# Patient Record
Sex: MALE | Race: ASIAN | Employment: FULL TIME | URBAN - METROPOLITAN AREA
[De-identification: names, ages, dates, MRNs, and addresses within clinical notes are randomized per-mention and may not be internally consistent; named-entity substitution may affect disease eponyms.]

---

## 2017-01-06 ENCOUNTER — OFFICE VISIT (OUTPATIENT)
Dept: FAMILY MEDICINE | Facility: CLINIC | Age: 37
End: 2017-01-06
Payer: COMMERCIAL

## 2017-01-06 VITALS
BODY MASS INDEX: 29.4 KG/M2 | WEIGHT: 210 LBS | SYSTOLIC BLOOD PRESSURE: 110 MMHG | TEMPERATURE: 99.3 F | HEART RATE: 80 BPM | DIASTOLIC BLOOD PRESSURE: 80 MMHG | HEIGHT: 71 IN

## 2017-01-06 DIAGNOSIS — K52.9 GASTROENTERITIS: Primary | ICD-10-CM

## 2017-01-06 DIAGNOSIS — R19.7 DIARRHEA, UNSPECIFIED TYPE: ICD-10-CM

## 2017-01-06 LAB
ERYTHROCYTE [DISTWIDTH] IN BLOOD BY AUTOMATED COUNT: 14.4 % (ref 10–15)
HCT VFR BLD AUTO: 50.6 % (ref 40–53)
HGB BLD-MCNC: 17.6 G/DL (ref 13.3–17.7)
MCH RBC QN AUTO: 31 PG (ref 26.5–33)
MCHC RBC AUTO-ENTMCNC: 34.8 G/DL (ref 31.5–36.5)
MCV RBC AUTO: 89 FL (ref 78–100)
PLATELET # BLD AUTO: 215 10E9/L (ref 150–450)
RBC # BLD AUTO: 5.68 10E12/L (ref 4.4–5.9)
WBC # BLD AUTO: 9.2 10E9/L (ref 4–11)

## 2017-01-06 PROCEDURE — 99213 OFFICE O/P EST LOW 20 MIN: CPT | Performed by: FAMILY MEDICINE

## 2017-01-06 PROCEDURE — 85027 COMPLETE CBC AUTOMATED: CPT | Performed by: FAMILY MEDICINE

## 2017-01-06 PROCEDURE — 36415 COLL VENOUS BLD VENIPUNCTURE: CPT | Performed by: FAMILY MEDICINE

## 2017-01-06 RX ORDER — LOPERAMIDE HYDROCHLORIDE 2 MG/1
TABLET ORAL
Qty: 30 TABLET | Refills: 0 | Status: SHIPPED | OUTPATIENT
Start: 2017-01-06 | End: 2017-08-03

## 2017-01-06 NOTE — PROGRESS NOTES
SUBJECTIVE:                                                    Kwasi Gonzalez is a 36 year old male who presents to clinic today for the following health issues:      Diarrhea     Onset: last night     Description:   Consistency of stool: watery and runny  Blood in stool: no   Number of loose stools in past 24 hours: 10    Progression of Symptoms:  same    Accompanying Signs & Symptoms:  Fever: YES  Nausea or vomiting; YES  Abdominal pain: YES  Episodes of constipation: no   Weight loss: no    History:   Ill contacts: no   Recent use of antibiotics: no    Recent travels: no          Recent medication-new or changes(Rx or OTC): no     Precipitating factors:       Alleviating factors:          Therapies Tried and outcome:  ; Outcome:     Problem list and histories reviewed & adjusted, as indicated.  Additional history: as documented    Patient Active Problem List   Diagnosis     Elevated serum creatinine     Elevated liver enzymes     Family history of early CAD     Family history of diabetes mellitus     Obesity     Hyperlipidemia LDL goal <130     Vitamin D deficiency     Type 2 diabetes mellitus without complications (H)     Past Surgical History   Procedure Laterality Date     No history of surgery         Social History   Substance Use Topics     Smoking status: Former Smoker     Types: Cigarettes     Quit date: 2015     Smokeless tobacco: Never Used     Alcohol Use: No     Family History   Problem Relation Age of Onset     DIABETES Mother      DIABETES Father      HEART DISEASE Father      CAD   of MI age 58     C.A.D. Mother      CAD age 64         Current Outpatient Prescriptions   Medication Sig Dispense Refill     loperamide (IMODIUM A-D) 2 MG tablet Take 2 tabs (4 mg) after first loose stool, and then take one tab (2 mg) after each diarrheal stool.  Max of 8 tabs (16 mg) per day. 30 tablet 0     blood glucose monitoring (NO BRAND SPECIFIED) test strip Use to test blood sugar one time daily or as  "directed.  Please dispense Accucheck Masha test strips. 1 Box 6     blood glucose monitoring (ACCU-CHEK MASHA PLUS) meter device kit Use to test blood sugars one time daily or as directed. 1 kit 0     blood glucose monitoring (ACCU-CHEK MULTICLIX) lancets Use to test blood sugar once daily or as directed. 1 Box 6     Allergies   Allergen Reactions     No Known Drug Allergies      Recent Labs   Lab Test  10/20/16   0900  05/27/16   0854  03/17/16   0805  07/31/15   0722   A1C  5.8  5.8   --   6.1*   LDL  114*  97  107*  104   HDL  43  36*  34*  35*   TRIG  143  139  239*  270*   ALT   --   52  70  62   CR  1.31*  1.29*  1.24  1.28*   GFRESTIMATED  61  63  66  64   GFRESTBLACK  74  76  80  77   POTASSIUM  4.5  4.3  4.3  4.4      BP Readings from Last 3 Encounters:   01/06/17 110/80   11/08/16 130/86   10/20/16 122/80    Wt Readings from Last 3 Encounters:   01/06/17 210 lb (95.255 kg)   11/08/16 200 lb (90.719 kg)   10/20/16 201 lb (91.173 kg)                    ROS:  Constitutional, HEENT, cardiovascular, pulmonary, gi and gu systems are negative, except as otherwise noted.    OBJECTIVE:                                                    /80 mmHg  Pulse 80  Temp(Src) 99.3  F (37.4  C) (Tympanic)  Ht 5' 11\" (1.803 m)  Wt 210 lb (95.255 kg)  BMI 29.30 kg/m2  Body mass index is 29.3 kg/(m^2).  GENERAL: healthy, alert and no distress  NECK: no adenopathy, no asymmetry, masses, or scars and thyroid normal to palpation  RESP: lungs clear to auscultation - no rales, rhonchi or wheezes  CV: regular rate and rhythm, normal S1 S2, no S3 or S4, no murmur, click or rub, no peripheral edema and peripheral pulses strong  ABDOMEN: soft, nontender, no hepatosplenomegaly, no masses and bowel sounds normal  MS: no gross musculoskeletal defects noted, no edema         ASSESSMENT/PLAN:                                                    Kwasi was seen today for diarrhea.    Diagnoses and all orders for this " visit:    Gastroenteritis  -     CBC with platelets  -     Enteric Bacteria and Virus Panel by [facility] Stool; Future  -     Giardia antigen; Future  -     Clostridium difficile Toxin B PCR; Future  -     Fecal leukocyte; Future  -     Ova and Parasite Exam Routine; Future  -     loperamide (IMODIUM A-D) 2 MG tablet; Take 2 tabs (4 mg) after first loose stool, and then take one tab (2 mg) after each diarrheal stool.  Max of 8 tabs (16 mg) per day.    Diarrhea, unspecified type  -     loperamide (IMODIUM A-D) 2 MG tablet; Take 2 tabs (4 mg) after first loose stool, and then take one tab (2 mg) after each diarrheal stool.  Max of 8 tabs (16 mg) per day.      Will review lab and consider if needed abx         Samuel Landrum MD  [facility] [place]

## 2017-01-06 NOTE — NURSING NOTE
"Chief Complaint   Patient presents with     Diarrhea       Initial /80 mmHg  Pulse 80  Temp(Src) 99.3  F (37.4  C) (Tympanic)  Ht 5' 11\" (1.803 m)  Wt 210 lb (95.255 kg)  BMI 29.30 kg/m2 Estimated body mass index is 29.3 kg/(m^2) as calculated from the following:    Height as of this encounter: 5' 11\" (1.803 m).    Weight as of this encounter: 210 lb (95.255 kg).  BP completed using cuff size: pieter Valencia CMA    "

## 2017-08-03 ENCOUNTER — OFFICE VISIT (OUTPATIENT)
Dept: FAMILY MEDICINE | Facility: CLINIC | Age: 37
End: 2017-08-03
Payer: COMMERCIAL

## 2017-08-03 VITALS
WEIGHT: 202 LBS | HEIGHT: 71 IN | TEMPERATURE: 97.6 F | BODY MASS INDEX: 28.28 KG/M2 | HEART RATE: 82 BPM | SYSTOLIC BLOOD PRESSURE: 120 MMHG | OXYGEN SATURATION: 98 % | RESPIRATION RATE: 14 BRPM | DIASTOLIC BLOOD PRESSURE: 81 MMHG

## 2017-08-03 DIAGNOSIS — Z13.89 SCREENING FOR DIABETIC PERIPHERAL NEUROPATHY: ICD-10-CM

## 2017-08-03 DIAGNOSIS — E11.9 TYPE 2 DIABETES MELLITUS WITHOUT COMPLICATION, UNSPECIFIED LONG TERM INSULIN USE STATUS: Primary | ICD-10-CM

## 2017-08-03 DIAGNOSIS — R74.8 ELEVATED LIVER ENZYMES: ICD-10-CM

## 2017-08-03 DIAGNOSIS — R79.89 ELEVATED SERUM CREATININE: ICD-10-CM

## 2017-08-03 LAB
ANION GAP SERPL CALCULATED.3IONS-SCNC: 6 MMOL/L (ref 3–14)
BUN SERPL-MCNC: 22 MG/DL (ref 7–30)
CALCIUM SERPL-MCNC: 9.8 MG/DL (ref 8.5–10.1)
CHLORIDE SERPL-SCNC: 103 MMOL/L (ref 94–109)
CHOLEST SERPL-MCNC: 196 MG/DL
CO2 SERPL-SCNC: 29 MMOL/L (ref 20–32)
CREAT SERPL-MCNC: 1.43 MG/DL (ref 0.66–1.25)
GFR SERPL CREATININE-BSD FRML MDRD: 55 ML/MIN/1.7M2
GLUCOSE SERPL-MCNC: 102 MG/DL (ref 70–99)
HBA1C MFR BLD: 5.8 % (ref 4.3–6)
HDLC SERPL-MCNC: 48 MG/DL
LDLC SERPL CALC-MCNC: 113 MG/DL
NONHDLC SERPL-MCNC: 148 MG/DL
POTASSIUM SERPL-SCNC: 4.4 MMOL/L (ref 3.4–5.3)
SODIUM SERPL-SCNC: 138 MMOL/L (ref 133–144)
TRIGL SERPL-MCNC: 174 MG/DL

## 2017-08-03 PROCEDURE — 83036 HEMOGLOBIN GLYCOSYLATED A1C: CPT | Performed by: FAMILY MEDICINE

## 2017-08-03 PROCEDURE — 36415 COLL VENOUS BLD VENIPUNCTURE: CPT | Performed by: FAMILY MEDICINE

## 2017-08-03 PROCEDURE — 80048 BASIC METABOLIC PNL TOTAL CA: CPT | Performed by: FAMILY MEDICINE

## 2017-08-03 PROCEDURE — 80061 LIPID PANEL: CPT | Performed by: FAMILY MEDICINE

## 2017-08-03 PROCEDURE — 99214 OFFICE O/P EST MOD 30 MIN: CPT | Performed by: FAMILY MEDICINE

## 2017-08-03 NOTE — NURSING NOTE
"Chief Complaint   Patient presents with     LAB REQUEST       Initial /81 (Cuff Size: Adult Large)  Pulse 82  Temp 97.6  F (36.4  C) (Tympanic)  Resp 14  Ht 5' 11\" (1.803 m)  Wt 202 lb (91.6 kg)  SpO2 98%  BMI 28.17 kg/m2 Estimated body mass index is 28.17 kg/(m^2) as calculated from the following:    Height as of this encounter: 5' 11\" (1.803 m).    Weight as of this encounter: 202 lb (91.6 kg).  Medication Reconciliation: complete   Мария Arora, CMA    "

## 2017-08-03 NOTE — PROGRESS NOTES
SUBJECTIVE:                                                    Kwasi Gonzalez is a 37 year old male who presents to clinic today for the following health issues:      Lab Request         Description (location/character/radiation): Pt would like to have his Lipids, A1c and vitamin D levles checked.          Problem list and histories reviewed & adjusted, as indicated.  Additional history: as documented    Patient Active Problem List   Diagnosis     Elevated serum creatinine     Elevated liver enzymes     Family history of early CAD     Family history of diabetes mellitus     Obesity     Hyperlipidemia LDL goal <130     Vitamin D deficiency     Type 2 diabetes mellitus without complications (H)     Past Surgical History:   Procedure Laterality Date     NO HISTORY OF SURGERY         Social History   Substance Use Topics     Smoking status: Former Smoker     Types: Cigarettes     Quit date: 2015     Smokeless tobacco: Never Used     Alcohol use No     Family History   Problem Relation Age of Onset     DIABETES Mother      DIABETES Father      HEART DISEASE Father      CAD   of MI age 58     C.A.D. Mother      CAD age 64         Current Outpatient Prescriptions   Medication Sig Dispense Refill     blood glucose monitoring (NO BRAND SPECIFIED) test strip Use to test blood sugar one time daily or as directed.  Please dispense Accucheck Masha test strips. 1 Box 6     blood glucose monitoring (ACCU-CHEK MASHA PLUS) meter device kit Use to test blood sugars one time daily or as directed. 1 kit 0     blood glucose monitoring (ACCU-CHEK MULTICLIX) lancets Use to test blood sugar once daily or as directed. 1 Box 6     Allergies   Allergen Reactions     No Known Drug Allergies      Recent Labs   Lab Test  10/20/16   0900  16   0854  16   0805  07/31/15   0722   A1C  5.8  5.8   --   6.1*   LDL  114*  97  107*  104   HDL  43  36*  34*  35*   TRIG  143  139  239*  270*   ALT   --   52  70  62   CR  1.31*  1.29*   "1.24  1.28*   GFRESTIMATED  61  63  66  64   GFRESTBLACK  74  76  80  77   POTASSIUM  4.5  4.3  4.3  4.4      BP Readings from Last 3 Encounters:   08/03/17 120/81   01/06/17 110/80   11/08/16 130/86    Wt Readings from Last 3 Encounters:   08/03/17 202 lb (91.6 kg)   01/06/17 210 lb (95.3 kg)   11/08/16 200 lb (90.7 kg)                          Reviewed and updated as needed this visit by clinical staff     Reviewed and updated as needed this visit by Provider         ROS:  Constitutional, HEENT, cardiovascular, pulmonary, gi and gu systems are negative, except as otherwise noted.      OBJECTIVE:   /81 (Cuff Size: Adult Large)  Pulse 82  Temp 97.6  F (36.4  C) (Tympanic)  Resp 14  Ht 5' 11\" (1.803 m)  Wt 202 lb (91.6 kg)  SpO2 98%  BMI 28.17 kg/m2  Body mass index is 28.17 kg/(m^2).  GENERAL: healthy, alert and no distress  EYES: Eyes grossly normal to inspection, PERRL and conjunctivae and sclerae normal  HENT: ear canals and TM's normal, nose and mouth without ulcers or lesions  NECK: no adenopathy, no asymmetry, masses, or scars and thyroid normal to palpation  RESP: lungs clear to auscultation - no rales, rhonchi or wheezes  CV: regular rate and rhythm, normal S1 S2, no S3 or S4, no murmur, click or rub, no peripheral edema and peripheral pulses strong  ABDOMEN: soft, nontender, no hepatosplenomegaly, no masses and bowel sounds normal   (male): normal male genitalia without lesions or urethral discharge, no hernia  MS: no gross musculoskeletal defects noted, no edema  SKIN: no suspicious lesions or rashes  NEURO: Normal strength and tone, mentation intact and speech normal  BACK: no CVA tenderness, no paralumbar tenderness        ASSESSMENT/PLAN:   ASSESSMENT / PLAN:  (E11.9) Type 2 diabetes mellitus without complication, unspecified long term insulin use status (H)  (primary encounter diagnosis)  Comment: has been stable without medicine, has been keep working on life style modification, will " have him to check lab   Plan: FOOT EXAM  NO CHARGE [06711.114], BASIC         METABOLIC PANEL, HEMOGLOBIN A1C, Lipid panel         reflex to direct LDL            (Z13.89) Screening for diabetic peripheral neuropathy  Plan: FOOT EXAM  NO CHARGE [20749.114]            (R79.89) Elevated serum creatinine  Comment: stable, working on hydration, will keep watching sx   Plan: FOOT EXAM  NO CHARGE [98046.114], BASIC         METABOLIC PANEL, HEMOGLOBIN A1C, Lipid panel         reflex to direct LDL            (R74.8) Elevated liver enzymes  Comment: stable   Plan: FOOT EXAM  NO CHARGE [45367.114], BASIC         METABOLIC PANEL, HEMOGLOBIN A1C, Lipid panel         reflex to direct LDL                FUTURE APPOINTMENTS:       - Follow-up visit at next CPE    Samuel Landrum MD  [facility] [place]

## 2017-08-03 NOTE — MR AVS SNAPSHOT
After Visit Summary   8/3/2017    Kwasi Gonzalez    MRN: [id number]           Patient Information     Date Of Birth          1980        Visit Information        Provider Department      8/3/2017 9:20 AM Samuel Landrum MD [facility] [place]        Today's Diagnoses     Type 2 diabetes mellitus without complication, unspecified long term insulin use status (H)    -  1    Screening for diabetic peripheral neuropathy        Elevated serum creatinine        Elevated liver enzymes           Follow-ups after your visit        Who to contact     If you have questions or need follow up information about today's clinic visit or your schedule please contact [facility] directly at [phone].  Normal or non-critical lab and imaging results will be communicated to you by MyChart, letter or phone within 4 business days after the clinic has received the results. If you do not hear from us within 7 days, please contact the clinic through [employer] or phone. If you have a critical or abnormal lab result, we will notify you by phone as soon as possible.  Submit refill requests through [employer] or call your pharmacy and they will forward the refill request to us. Please allow 3 business days for your refill to be completed.          Additional Information About Your Visit        MyChart Information     [employer] gives you secure access to your electronic health record. If you see a primary care provider, you can also send messages to your care team and make appointments. If you have questions, please call your primary care clinic.  If you do not have a primary care provider, please call [phone] and they will assist you.        Care EveryWhere ID     This is your Care EveryWhere ID. This could be used by other organizations to access your [place] medical records  [id number]        Your Vitals Were     Pulse Temperature Respirations Height Pulse Oximetry BMI (Body Mass Index)    82 97.6  " F (36.4  C) (Tympanic) 14 5' 11\" (1.803 m) 98% 28.17 kg/m2       Blood Pressure from Last 3 Encounters:   08/03/17 120/81   01/06/17 110/80   11/08/16 130/86    Weight from Last 3 Encounters:   08/03/17 202 lb (91.6 kg)   01/06/17 210 lb (95.3 kg)   11/08/16 200 lb (90.7 kg)              We Performed the Following     BASIC METABOLIC PANEL     FOOT EXAM  NO CHARGE [49240.114]     HEMOGLOBIN A1C     Lipid panel reflex to direct LDL        Primary Care Provider Office Phone # Fax #    Samuel Landrum [phone]       [address] [address]        Equal Access to Services     JJ HUITRON : Hadii medina pacheco hadasho Soomaali, waaxda luqadaha, qaybta kaalmada adeegyada, mady al. So [facility] [phone].    ATENCIÓN: Si habla español, tiene a chan disposición servicios gratuitos de asistencia lingüística. [facility] [phone].    We comply with applicable federal civil rights laws and [place] laws. We do not discriminate on the basis of race, color, national origin, age, disability sex, sexual orientation or gender identity.            Thank you!     Thank you for choosing [facility]  for your care. Our goal is always to provide you with excellent care. Hearing back from our patients is one way we can continue to improve our services. Please take a few minutes to complete the written survey that you may receive in the mail after your visit with us. Thank you!             Your Updated Medication List - Protect others around you: Learn how to safely use, store and throw away your medicines at www.disposemymeds.org.          This list is accurate as of: 8/3/17  9:39 AM.  Always use your most recent med list.                   Brand Name Dispense Instructions for use Diagnosis    blood glucose monitoring lancets     1 Box    Use to test blood sugar once daily or as directed.    Family history of diabetes mellitus    "    blood glucose monitoring meter device kit     1 kit    Use to test blood sugars one time daily or as directed.    Family history of diabetes mellitus       blood glucose monitoring test strip    no brand specified    1 Box    Use to test blood sugar one time daily or as directed. Please dispense Accucheck Masha test strips.    Family history of diabetes mellitus

## 2017-10-25 DIAGNOSIS — Z83.3 FAMILY HISTORY OF DIABETES MELLITUS: ICD-10-CM

## 2017-10-25 NOTE — TELEPHONE ENCOUNTER
Prescription approved per [facility], [facility] or [facility] refill protocol.  Claudia Constantino RN - Triage  [facility]

## 2017-11-22 ENCOUNTER — OFFICE VISIT (OUTPATIENT)
Dept: FAMILY MEDICINE | Facility: CLINIC | Age: 37
End: 2017-11-22
Payer: COMMERCIAL

## 2017-11-22 VITALS
OXYGEN SATURATION: 98 % | DIASTOLIC BLOOD PRESSURE: 82 MMHG | RESPIRATION RATE: 14 BRPM | TEMPERATURE: 97 F | SYSTOLIC BLOOD PRESSURE: 125 MMHG | HEART RATE: 76 BPM | HEIGHT: 71 IN | BODY MASS INDEX: 27.86 KG/M2 | WEIGHT: 199 LBS

## 2017-11-22 DIAGNOSIS — Z23 NEED FOR PROPHYLACTIC VACCINATION WITH TETANUS-DIPHTHERIA (TD): ICD-10-CM

## 2017-11-22 DIAGNOSIS — Z23 NEED FOR PROPHYLACTIC VACCINATION AGAINST STREPTOCOCCUS PNEUMONIAE (PNEUMOCOCCUS): ICD-10-CM

## 2017-11-22 DIAGNOSIS — Z00.00 HEALTH CARE MAINTENANCE: Primary | ICD-10-CM

## 2017-11-22 DIAGNOSIS — E11.9 TYPE 2 DIABETES MELLITUS WITHOUT COMPLICATION, UNSPECIFIED LONG TERM INSULIN USE STATUS: ICD-10-CM

## 2017-11-22 DIAGNOSIS — R79.89 ELEVATED SERUM CREATININE: ICD-10-CM

## 2017-11-22 DIAGNOSIS — Z23 NEED FOR PROPHYLACTIC VACCINATION AND INOCULATION AGAINST INFLUENZA: ICD-10-CM

## 2017-11-22 DIAGNOSIS — R74.8 ELEVATED LIVER ENZYMES: ICD-10-CM

## 2017-11-22 DIAGNOSIS — E78.5 HYPERLIPIDEMIA LDL GOAL <130: ICD-10-CM

## 2017-11-22 LAB
ALT SERPL W P-5'-P-CCNC: 44 U/L (ref 0–70)
ANION GAP SERPL CALCULATED.3IONS-SCNC: 3 MMOL/L (ref 3–14)
BUN SERPL-MCNC: 11 MG/DL (ref 7–30)
CALCIUM SERPL-MCNC: 9.6 MG/DL (ref 8.5–10.1)
CHLORIDE SERPL-SCNC: 105 MMOL/L (ref 94–109)
CHOLEST SERPL-MCNC: 179 MG/DL
CO2 SERPL-SCNC: 30 MMOL/L (ref 20–32)
CREAT SERPL-MCNC: 1.31 MG/DL (ref 0.66–1.25)
ERYTHROCYTE [DISTWIDTH] IN BLOOD BY AUTOMATED COUNT: 13.5 % (ref 10–15)
GFR SERPL CREATININE-BSD FRML MDRD: 61 ML/MIN/1.7M2
GLUCOSE SERPL-MCNC: 98 MG/DL (ref 70–99)
HCT VFR BLD AUTO: 48.7 % (ref 40–53)
HDLC SERPL-MCNC: 55 MG/DL
HGB BLD-MCNC: 16.8 G/DL (ref 13.3–17.7)
LDLC SERPL CALC-MCNC: 100 MG/DL
MCH RBC QN AUTO: 30.4 PG (ref 26.5–33)
MCHC RBC AUTO-ENTMCNC: 34.5 G/DL (ref 31.5–36.5)
MCV RBC AUTO: 88 FL (ref 78–100)
NONHDLC SERPL-MCNC: 124 MG/DL
PLATELET # BLD AUTO: 259 10E9/L (ref 150–450)
POTASSIUM SERPL-SCNC: 4.3 MMOL/L (ref 3.4–5.3)
RBC # BLD AUTO: 5.52 10E12/L (ref 4.4–5.9)
SODIUM SERPL-SCNC: 138 MMOL/L (ref 133–144)
T4 FREE SERPL-MCNC: 0.98 NG/DL (ref 0.76–1.46)
TRIGL SERPL-MCNC: 121 MG/DL
TSH SERPL DL<=0.005 MIU/L-ACNC: 5.81 MU/L (ref 0.4–4)
WBC # BLD AUTO: 7.7 10E9/L (ref 4–11)

## 2017-11-22 PROCEDURE — 80048 BASIC METABOLIC PNL TOTAL CA: CPT | Performed by: FAMILY MEDICINE

## 2017-11-22 PROCEDURE — 80061 LIPID PANEL: CPT | Performed by: FAMILY MEDICINE

## 2017-11-22 PROCEDURE — 36415 COLL VENOUS BLD VENIPUNCTURE: CPT | Performed by: FAMILY MEDICINE

## 2017-11-22 PROCEDURE — 84443 ASSAY THYROID STIM HORMONE: CPT | Performed by: FAMILY MEDICINE

## 2017-11-22 PROCEDURE — 84460 ALANINE AMINO (ALT) (SGPT): CPT | Performed by: FAMILY MEDICINE

## 2017-11-22 PROCEDURE — 85027 COMPLETE CBC AUTOMATED: CPT | Performed by: FAMILY MEDICINE

## 2017-11-22 PROCEDURE — 99395 PREV VISIT EST AGE 18-39: CPT | Performed by: FAMILY MEDICINE

## 2017-11-22 PROCEDURE — 90686 IIV4 VACC NO PRSV 0.5 ML IM: CPT | Performed by: FAMILY MEDICINE

## 2017-11-22 PROCEDURE — 82043 UR ALBUMIN QUANTITATIVE: CPT | Performed by: FAMILY MEDICINE

## 2017-11-22 PROCEDURE — 84439 ASSAY OF FREE THYROXINE: CPT | Performed by: FAMILY MEDICINE

## 2017-11-22 PROCEDURE — 90471 IMMUNIZATION ADMIN: CPT | Performed by: FAMILY MEDICINE

## 2017-11-22 NOTE — PROGRESS NOTES

## 2017-11-22 NOTE — MR AVS SNAPSHOT
After Visit Summary   11/22/2017    Kwasi Gonzalez    MRN: [id number]           Patient Information     Date Of Birth          1980        Visit Information        Provider Department      11/22/2017 10:00 AM Samuel Landrum MD [facility] [place]        Today's Diagnoses     Health care maintenance    -  1    Need for prophylactic vaccination and inoculation against influenza        Need for prophylactic vaccination against Streptococcus pneumoniae (pneumococcus)        Need for prophylactic vaccination with tetanus-diphtheria (TD)        Elevated serum creatinine        Elevated liver enzymes        Hyperlipidemia LDL goal <130        Type 2 diabetes mellitus without complication, unspecified long term insulin use status (H)          Care Instructions      Preventive Health Recommendations  Male Ages 26 - 39    Yearly exam:             See your health care provider every year in order to  o   Review health changes.   o   Discuss preventive care.    o   Review your medicines if your doctor has prescribed any.    You should be tested each year for STDs (sexually transmitted diseases), if you re at risk.     After age 35, talk to your provider about cholesterol testing. If you are at risk for heart disease, have your cholesterol tested at least every 5 years.     If you are at risk for diabetes, you should have a diabetes test (fasting glucose).  Shots: Get a flu shot each year. Get a tetanus shot every 10 years.     Nutrition:    Eat at least 5 servings of fruits and vegetables daily.     Eat whole-grain bread, whole-wheat pasta and brown rice instead of white grains and rice.     Talk to your provider about Calcium and Vitamin D.     Lifestyle    Exercise for at least 150 minutes a week (30 minutes a day, 5 days a week). This will help you control your weight and prevent disease.     Limit alcohol to one drink per day.     No smoking.     Wear sunscreen to prevent skin cancer.     See your  dentist every six months for an exam and cleaning.     Preventive Health Recommendations  Male Ages 26 - 39    Yearly exam:             See your health care provider every year in order to  o   Review health changes.   o   Discuss preventive care.    o   Review your medicines if your doctor has prescribed any.    You should be tested each year for STDs (sexually transmitted diseases), if you re at risk.     After age 35, talk to your provider about cholesterol testing. If you are at risk for heart disease, have your cholesterol tested at least every 5 years.     If you are at risk for diabetes, you should have a diabetes test (fasting glucose).  Shots: Get a flu shot each year. Get a tetanus shot every 10 years.     Nutrition:    Eat at least 5 servings of fruits and vegetables daily.     Eat whole-grain bread, whole-wheat pasta and brown rice instead of white grains and rice.     Talk to your provider about Calcium and Vitamin D.     Lifestyle    Exercise for at least 150 minutes a week (30 minutes a day, 5 days a week). This will help you control your weight and prevent disease.     Limit alcohol to one drink per day.     No smoking.     Wear sunscreen to prevent skin cancer.     See your dentist every six months for an exam and cleaning.             Follow-ups after your visit        Who to contact     If you have questions or need follow up information about today's clinic visit or your schedule please contact [facility] directly at [phone].  Normal or non-critical lab and imaging results will be communicated to you by MyChart, letter or phone within 4 business days after the clinic has received the results. If you do not hear from us within 7 days, please contact the clinic through MyChart or phone. If you have a critical or abnormal lab result, we will notify you by phone as soon as possible.  Submit refill requests through [employer] or call your pharmacy and they will forward the refill  "request to us. Please allow 3 business days for your refill to be completed.          Additional Information About Your Visit        [employer] Information     [employer] gives you secure access to your electronic health record. If you see a primary care provider, you can also send messages to your care team and make appointments. If you have questions, please call your primary care clinic.  If you do not have a primary care provider, please call [phone] and they will assist you.        Care EveryWhere ID     This is your Care EveryWhere ID. This could be used by other organizations to access your [place] medical records  [id number]        Your Vitals Were     Pulse Temperature Respirations Height Pulse Oximetry BMI (Body Mass Index)    76 97  F (36.1  C) (Tympanic) 14 5' 11\" (1.803 m) 98% 27.75 kg/m2       Blood Pressure from Last 3 Encounters:   11/22/17 125/82   08/03/17 120/81   01/06/17 110/80    Weight from Last 3 Encounters:   11/22/17 199 lb (90.3 kg)   08/03/17 202 lb (91.6 kg)   01/06/17 210 lb (95.3 kg)              We Performed the Following     Albumin Random Urine Quantitative with Creat Ratio     ALT     BASIC METABOLIC PANEL     CBC with platelets     Lipid panel reflex to direct LDL Fasting     TSH WITH FREE T4 REFLEX        Primary Care Provider Office Phone # Fax #    Samuel Landrum [phone]       [address] [place] [address]        Equal Access to Services     [facility]: Hadii aad ku hadasho Soomaali, waaxda luqadaha, qaybta kaalmada adeegyada, mady pack adewinnie okeefe la'aan ah. So [facility] [phone].    ATENCIÓN: Si habla español, tiene a chan disposición servicios gratuitos de asistencia lingüística. Llame al [phone].    We comply with applicable federal civil rights laws and [place] laws. We do not discriminate on the basis of race, color, national origin, age, disability, sex, sexual orientation, or gender identity.            Thank you!  "    Thank you for choosing [facility]  for your care. Our goal is always to provide you with excellent care. Hearing back from our patients is one way we can continue to improve our services. Please take a few minutes to complete the written survey that you may receive in the mail after your visit with us. Thank you!             Your Updated Medication List - Protect others around you: Learn how to safely use, store and throw away your medicines at www.disposemymeds.org.          This list is accurate as of: 11/22/17 10:28 AM.  Always use your most recent med list.                   Brand Name Dispense Instructions for use Diagnosis    blood glucose monitoring lancets     1 Box    Use to test blood sugar once daily or as directed.    Family history of diabetes mellitus       blood glucose monitoring meter device kit     1 kit    Use to test blood sugars one time daily or as directed.    Family history of diabetes mellitus       ONETOUCH ULTRA test strip   Generic drug:  blood glucose monitoring     50 strip    USE TO TEST ONE TIME DAILY OR AS DIRECTED    Family history of diabetes mellitus

## 2017-11-22 NOTE — NURSING NOTE
"Chief Complaint   Patient presents with     Physical       Initial /82 (Cuff Size: Adult Large)  Pulse 76  Temp 97  F (36.1  C) (Tympanic)  Resp 14  Ht 5' 11\" (1.803 m)  Wt 199 lb (90.3 kg)  SpO2 98%  BMI 27.75 kg/m2 Estimated body mass index is 27.75 kg/(m^2) as calculated from the following:    Height as of this encounter: 5' 11\" (1.803 m).    Weight as of this encounter: 199 lb (90.3 kg).  Medication Reconciliation: complete   Мария Arora, CMA    "

## 2017-11-22 NOTE — PROGRESS NOTES
SUBJECTIVE:   CC: Kwasi Gonzalez is an 37 year old male who presents for preventative health visit.     Healthy Habits:    Do you get at least three servings of calcium containing foods daily (dairy, green leafy vegetables, etc.)? yes    Amount of exercise or daily activities, outside of work: 5 day(s) per week    Problems taking medications regularly not applicable    Medication side effects: No    Have you had an eye exam in the past two years? yes    Do you see a dentist twice per year? yes    Do you have sleep apnea, excessive snoring or daytime drowsiness?no        Today's PHQ-2 Score: PHQ-2 ( 1999 Pfizer) 8/3/2017 11/8/2016   Q1: Little interest or pleasure in doing things 0 0   Q2: Feeling down, depressed or hopeless 0 0   PHQ-2 Score 0 0         Abuse: Current or Past(Physical, Sexual or Emotional)- No  Do you feel safe in your environment - Yes  Social History   Substance Use Topics     Smoking status: Former Smoker     Types: Cigarettes     Quit date: 6/1/2015     Smokeless tobacco: Never Used     Alcohol use No     The patient does not drink >3 drinks per day nor >7 drinks per week.    Last PSA: No results found for: PSA    Reviewed orders with patient. Reviewed health maintenance and updated orders accordingly - Yes  BP Readings from Last 3 Encounters:   11/22/17 125/82   08/03/17 120/81   01/06/17 110/80    Wt Readings from Last 3 Encounters:   11/22/17 199 lb (90.3 kg)   08/03/17 202 lb (91.6 kg)   01/06/17 210 lb (95.3 kg)                  Patient Active Problem List   Diagnosis     Elevated serum creatinine     Elevated liver enzymes     Family history of early CAD     Family history of diabetes mellitus     Obesity     Hyperlipidemia LDL goal <130     Vitamin D deficiency     Type 2 diabetes mellitus without complications (H)     Past Surgical History:   Procedure Laterality Date     NO HISTORY OF SURGERY         Social History   Substance Use Topics     Smoking status: Former Smoker     Types:  Cigarettes     Quit date: 2015     Smokeless tobacco: Never Used     Alcohol use No     Family History   Problem Relation Age of Onset     DIABETES Mother      DIABETES Father      HEART DISEASE Father      CAD   of MI age 58     C.A.D. Mother      CAD age 64         Current Outpatient Prescriptions   Medication Sig Dispense Refill     ONE TOUCH ULTRA test strip USE TO TEST ONE TIME DAILY OR AS DIRECTED 50 strip 3     blood glucose monitoring (ACCU-CHEK MG PLUS) meter device kit Use to test blood sugars one time daily or as directed. 1 kit 0     blood glucose monitoring (ACCU-CHEK MULTICLIX) lancets Use to test blood sugar once daily or as directed. 1 Box 6     Allergies   Allergen Reactions     No Known Drug Allergies      Recent Labs   Lab Test  17   0936  10/20/16   0900  16   0854  16   0805  07/31/15   0722   A1C  5.8  5.8  5.8   --   6.1*   LDL  113*  114*  97  107*  104   HDL  48  43  36*  34*  35*   TRIG  174*  143  139  239*  270*   ALT   --    --   52  70  62   CR  1.43*  1.31*  1.29*  1.24  1.28*   GFRESTIMATED  55*  61  63  66  64   GFRESTBLACK  67  74  76  80  77   POTASSIUM  4.4  4.5  4.3  4.3  4.4              Reviewed and updated as needed this visit by clinical staff         Reviewed and updated as needed this visit by Provider        Past Medical History:   Diagnosis Date     Hyperlipidemia 2009     Hyperlipidemia LDL goal <130 2014     NO ACTIVE PROBLEMS      Vitamin D deficiency 8/3/2015      Past Surgical History:   Procedure Laterality Date     NO HISTORY OF SURGERY         ROS:  C: NEGATIVE for fever, chills, change in weight  I: NEGATIVE for worrisome rashes, moles or lesions  E: NEGATIVE for vision changes or irritation  ENT: NEGATIVE for ear, mouth and throat problems  R: NEGATIVE for significant cough or SOB  CV: NEGATIVE for chest pain, palpitations or peripheral edema  GI: NEGATIVE for nausea, abdominal pain, heartburn, or change in bowel  "habits   male: negative for dysuria, hematuria, decreased urinary stream, erectile dysfunction, urethral discharge  M: NEGATIVE for significant arthralgias or myalgia  N: NEGATIVE for weakness, dizziness or paresthesias  P: NEGATIVE for changes in mood or affect    OBJECTIVE:   /82 (Cuff Size: Adult Large)  Pulse 76  Temp 97  F (36.1  C) (Tympanic)  Resp 14  Ht 5' 11\" (1.803 m)  Wt 199 lb (90.3 kg)  SpO2 98%  BMI 27.75 kg/m2  EXAM:  GENERAL: healthy, alert and no distress  EYES: Eyes grossly normal to inspection, PERRL and conjunctivae and sclerae normal  HENT: ear canals and TM's normal, nose and mouth without ulcers or lesions  NECK: no adenopathy, no asymmetry, masses, or scars and thyroid normal to palpation  RESP: lungs clear to auscultation - no rales, rhonchi or wheezes  CV: regular rate and rhythm, normal S1 S2, no S3 or S4, no murmur, click or rub, no peripheral edema and peripheral pulses strong  ABDOMEN: soft, nontender, no hepatosplenomegaly, no masses and bowel sounds normal   (male): normal male genitalia without lesions or urethral discharge, no hernia  MS: no gross musculoskeletal defects noted, no edema  SKIN: no suspicious lesions or rashes  NEURO: Normal strength and tone, mentation intact and speech normal  BACK: no CVA tenderness, no paralumbar tenderness  PSYCH: mentation appears normal, affect normal/bright  LYMPH: no cervical, supraclavicular, axillary, or inguinal adenopathy    ASSESSMENT/PLAN:       ICD-10-CM    1. Health care maintenance Z00.00 BASIC METABOLIC PANEL     Albumin Random Urine Quantitative with Creat Ratio     TSH WITH FREE T4 REFLEX     CBC with platelets     ALT     Lipid panel reflex to direct LDL Fasting   2. Need for prophylactic vaccination and inoculation against influenza Z23    3. Need for prophylactic vaccination against Streptococcus pneumoniae (pneumococcus) Z23    4. Need for prophylactic vaccination with tetanus-diphtheria (TD) Z23    5. " "Elevated serum creatinine R79.89 BASIC METABOLIC PANEL     Albumin Random Urine Quantitative with Creat Ratio     TSH WITH FREE T4 REFLEX     CBC with platelets     ALT     Lipid panel reflex to direct LDL Fasting   6. Elevated liver enzymes R74.8 BASIC METABOLIC PANEL     Albumin Random Urine Quantitative with Creat Ratio     TSH WITH FREE T4 REFLEX     CBC with platelets     ALT     Lipid panel reflex to direct LDL Fasting   7. Hyperlipidemia LDL goal <130 E78.5 BASIC METABOLIC PANEL     Albumin Random Urine Quantitative with Creat Ratio     TSH WITH FREE T4 REFLEX     CBC with platelets     ALT     Lipid panel reflex to direct LDL Fasting   8. Type 2 diabetes mellitus without complication, unspecified long term insulin use status (H) E11.9 BASIC METABOLIC PANEL     Albumin Random Urine Quantitative with Creat Ratio     TSH WITH FREE T4 REFLEX     CBC with platelets     ALT     Lipid panel reflex to direct LDL Fasting       COUNSELING:  Reviewed preventive health counseling, as reflected in patient instructions           reports that he quit smoking about 2 years ago. His smoking use included Cigarettes. He has never used smokeless tobacco.      Estimated body mass index is 28.17 kg/(m^2) as calculated from the following:    Height as of 8/3/17: 5' 11\" (1.803 m).    Weight as of 8/3/17: 202 lb (91.6 kg).         Counseling Resources:  ATP IV Guidelines  Pooled Cohorts Equation Calculator  FRAX Risk Assessment  ICSI Preventive Guidelines  Dietary Guidelines for Americans, 2010  USDA's MyPlate  ASA Prophylaxis  Lung CA Screening    Samuel Landrum MD  [facility]  "

## 2017-11-23 LAB
CREAT UR-MCNC: 164 MG/DL
MICROALBUMIN UR-MCNC: <5 MG/L
MICROALBUMIN/CREAT UR: NORMAL MG/G CR (ref 0–17)

## 2018-01-29 ENCOUNTER — OFFICE VISIT (OUTPATIENT)
Dept: FAMILY MEDICINE | Facility: CLINIC | Age: 38
End: 2018-01-29
Payer: COMMERCIAL

## 2018-01-29 VITALS
WEIGHT: 208 LBS | SYSTOLIC BLOOD PRESSURE: 140 MMHG | DIASTOLIC BLOOD PRESSURE: 90 MMHG | HEART RATE: 120 BPM | TEMPERATURE: 98.5 F | BODY MASS INDEX: 29.01 KG/M2

## 2018-01-29 DIAGNOSIS — M25.511 ACUTE PAIN OF RIGHT SHOULDER: Primary | ICD-10-CM

## 2018-01-29 PROCEDURE — 99213 OFFICE O/P EST LOW 20 MIN: CPT | Performed by: INTERNAL MEDICINE

## 2018-01-29 NOTE — PATIENT INSTRUCTIONS
Try tylenol mainly for pain, ibuprofen here and there for pain. Topical medications like bengay or aspercreme can help as well.

## 2018-01-29 NOTE — PROGRESS NOTES
SUBJECTIVE:   Kwasi Gonzalez is a 37 year old male who presents to clinic today for the following health issues:      Joint Pain    Onset: a month     Description:   Location: right shoulder   Character: Dull ache    Intensity: mild    Progression of Symptoms: intermittent    Accompanying Signs & Symptoms:  Other symptoms: none    History:   Previous similar pain: no       Precipitating factors:   Trauma or overuse: YES- believes so, he was traveling and lifted a bag over that shoulder     Alleviating factors:  Improved by:  Ibuprofen    Therapies Tried and outcome: noted.     Kwasi is here with right shoulder pain.  Started about a month ago.  He was traveling and carrying a heavy bag on that shoulder, but no other injuries that he can recall.  Pain has not really improved for the past month.  He has not been doing any exercise since it started.  Taken ibuprofen a few times, does help temporarily.  Bothers him driving and lifting bags, also lying on that side.  No numbness, tingling, weakness, no radiation of the pain.       Reviewed and updated as needed this visit by clinical staff  Tobacco  Allergies  Meds       Reviewed and updated as needed this visit by Provider         ROS:  Steven, neuro reviewed,  otherwise negative unless noted above.       OBJECTIVE:     /90 (BP Location: Right arm, Patient Position: Chair, Cuff Size: Adult Large)  Pulse 120  Temp 98.5  F (36.9  C) (Tympanic)  Wt 208 lb (94.3 kg)  BMI 29.01 kg/m2  Body mass index is 29.01 kg/(m^2).    Gen: pleasant, well appearing man, no distress  R shoulder: No swelling or malalignment noted.  No tenderness. Full AROM. Full strength.  Negative Hawkin's and speed's test.     Diagnostic Test Results:  none     ASSESSMENT/PLAN:       1. Acute pain of right shoulder  No trauma, no signs of major ligament issue. Recommended tylenol mainly for pain, NSAIDs sparingly given his CKD.  Ice and/or heat, massage.  Referred to PT.   - [facility], HAND, AND  CHIROPRACTIC REFERRAL    F/U as needed for persistent or worsening symptoms. Check BP at home, if consistently elevated should follow up with PCP.       Ana Shrestha MD  [facility]

## 2018-01-29 NOTE — NURSING NOTE
"Chief Complaint   Patient presents with     Shoulder Pain       Initial BP (!) 141/99 (BP Location: Right arm, Patient Position: Chair, Cuff Size: Adult Large)  Pulse 120  Temp 98.5  F (36.9  C) (Tympanic)  Wt 208 lb (94.3 kg)  BMI 29.01 kg/m2 Estimated body mass index is 29.01 kg/(m^2) as calculated from the following:    Height as of 11/22/17: 5' 11\" (1.803 m).    Weight as of this encounter: 208 lb (94.3 kg).  Medication Reconciliation: complete  "

## 2018-01-29 NOTE — MR AVS SNAPSHOT
After Visit Summary   1/29/2018    Kwasi Gonzalez    MRN: [id number]           Patient Information     Date Of Birth          1980        Visit Information        Provider Department      1/29/2018 11:40 AM Ana Shrestha MD [facility] [place]        Today's Diagnoses     Acute pain of right shoulder    -  1      Care Instructions    Try tylenol mainly for pain, ibuprofen here and there for pain. Topical medications like bengay or aspercreme can help as well.           Follow-ups after your visit        Additional Services     [facility] PT, HAND, AND CHIROPRACTIC REFERRAL       **This order will print in the [facility] Scheduling Office**    Physical Therapy, Hand Therapy and Chiropractic Care are available through:    *[facility]  *[facility]  *[facility]    Call one number to schedule at any of the above locations: [phone].    Your provider has referred you to: Physical Therapy at [facility] or [facility]    Indication/Reason for Referral: Shoulder Pain  Onset of Illness: 1 month  Therapy Orders: Evaluate and Treat  Special Programs: None  Special Request: None    Leydi Holley      Additional Comments for the Therapist or Chiropractor: none    Please be aware that coverage of these services is subject to the terms and limitations of your health insurance plan.  Call member services at your health plan with any benefit or coverage questions.      Please bring the following to your appointment:    *Your personal calendar for scheduling future appointments  *Comfortable clothing                  Follow-up notes from your care team     Return if symptoms worsen or fail to improve.      Who to contact     If you have questions or need follow up information about today's clinic visit or your schedule please contact [facility] [place] directly at [phone].  Normal or non-critical lab and imaging results will be communicated to you by  MyChart, letter or phone within 4 business days after the clinic has received the results. If you do not hear from us within 7 days, please contact the clinic through [employer] or phone. If you have a critical or abnormal lab result, we will notify you by phone as soon as possible.  Submit refill requests through [employer] or call your pharmacy and they will forward the refill request to us. Please allow 3 business days for your refill to be completed.          Additional Information About Your Visit        [employer] Health Information     [employer] gives you secure access to your electronic health record. If you see a primary care provider, you can also send messages to your care team and make appointments. If you have questions, please call your primary care clinic.  If you do not have a primary care provider, please call [phone] and they will assist you.        Care EveryWhere ID     This is your Care EveryWhere ID. This could be used by other organizations to access your [place] medical records  [id number]        Your Vitals Were     Pulse Temperature BMI (Body Mass Index)             120 98.5  F (36.9  C) (Tympanic) 29.01 kg/m2          Blood Pressure from Last 3 Encounters:   01/29/18 (!) 141/99   11/22/17 125/82   08/03/17 120/81    Weight from Last 3 Encounters:   01/29/18 208 lb (94.3 kg)   11/22/17 199 lb (90.3 kg)   08/03/17 202 lb (91.6 kg)              We Performed the Following     [facility], HAND, AND CHIROPRACTIC REFERRAL        Primary Care Provider Office Phone # Fax #    Samuel ALBARADO MD [place] [phone]       [address] [place] [id number]        Equal Access to Services     [facility]: Hadii medina pacheco hadashjavier Sojade, waaxda luqadaha, qaybta kaalmamady eason. So [facility] [phone].    ATENCIÓN: Si habla español, tiene a chan disposición servicios gratuitos de asistencia lingüística. Llame al [phone].    We comply with applicable  federal civil rights laws and [place] laws. We do not discriminate on the basis of race, color, national origin, age, disability, sex, sexual orientation, or gender identity.            Thank you!     Thank you for choosing [facility] [place]  for your care. Our goal is always to provide you with excellent care. Hearing back from our patients is one way we can continue to improve our services. Please take a few minutes to complete the written survey that you may receive in the mail after your visit with us. Thank you!             Your Updated Medication List - Protect others around you: Learn how to safely use, store and throw away your medicines at www.disposemymeds.org.          This list is accurate as of 1/29/18 11:57 AM.  Always use your most recent med list.                   Brand Name Dispense Instructions for use Diagnosis    blood glucose monitoring lancets     1 Box    Use to test blood sugar once daily or as directed.    Family history of diabetes mellitus       blood glucose monitoring meter device kit     1 kit    Use to test blood sugars one time daily or as directed.    Family history of diabetes mellitus       ONETOUCH ULTRA test strip   Generic drug:  blood glucose monitoring     50 strip    USE TO TEST ONE TIME DAILY OR AS DIRECTED    Family history of diabetes mellitus

## 2018-11-05 ENCOUNTER — TELEPHONE (OUTPATIENT)
Dept: FAMILY MEDICINE | Facility: CLINIC | Age: 38
End: 2018-11-05

## 2019-02-06 ENCOUNTER — OFFICE VISIT (OUTPATIENT)
Dept: FAMILY MEDICINE | Facility: CLINIC | Age: 39
End: 2019-02-06
Payer: COMMERCIAL

## 2019-02-06 ENCOUNTER — TELEPHONE (OUTPATIENT)
Dept: FAMILY MEDICINE | Facility: CLINIC | Age: 39
End: 2019-02-06

## 2019-02-06 VITALS
HEART RATE: 103 BPM | WEIGHT: 214 LBS | BODY MASS INDEX: 29.96 KG/M2 | TEMPERATURE: 98.9 F | DIASTOLIC BLOOD PRESSURE: 78 MMHG | OXYGEN SATURATION: 98 % | SYSTOLIC BLOOD PRESSURE: 112 MMHG | RESPIRATION RATE: 16 BRPM | HEIGHT: 71 IN

## 2019-02-06 DIAGNOSIS — Z00.00 HEALTHCARE MAINTENANCE: Primary | ICD-10-CM

## 2019-02-06 DIAGNOSIS — R42 DIZZINESS: ICD-10-CM

## 2019-02-06 PROCEDURE — 93000 ELECTROCARDIOGRAM COMPLETE: CPT | Performed by: FAMILY MEDICINE

## 2019-02-06 PROCEDURE — 99395 PREV VISIT EST AGE 18-39: CPT | Performed by: FAMILY MEDICINE

## 2019-02-06 PROCEDURE — 99213 OFFICE O/P EST LOW 20 MIN: CPT | Mod: 25 | Performed by: FAMILY MEDICINE

## 2019-02-06 ASSESSMENT — MIFFLIN-ST. JEOR: SCORE: 1907.83

## 2019-02-06 NOTE — TELEPHONE ENCOUNTER
This am his /87.  Kwasi was driving today and started sweating and felt dizzy. No chest pain or SOB>  He pulled over and feels fine now. No symptoms now.  He has a mild sore throat. He was anxious but feels better now.        Patient checks his blood glucose daily but doesn't have dx.  Yesterday . He did not take it today.    /100 yesterday. Normal today.    He would like to see Dr Landrum today    Appt made    Lila Ruby RN- Triage [employer]

## 2019-02-06 NOTE — PROGRESS NOTES
SUBJECTIVE:   CC: Kwasi Gonzalez is an 38 year old male who presents for preventive health visit.     Healthy Habits:    Do you get at least three servings of calcium containing foods daily (dairy, green leafy vegetables, etc.)? no, taking calcium and/or vitamin D supplement: yes     Amount of exercise or daily activities, outside of work: 3 day(s) per week    Problems taking medications regularly No    Medication side effects: No    Have you had an eye exam in the past two years? yes    Do you see a dentist twice per year? yes    Do you have sleep apnea, excessive snoring or daytime drowsiness?yes      Dizziness      Duration: this morning around 8:00 AM    Description   Dizziness       Today's PHQ-2 Score:   PHQ-2 ( 1999 Pfizer) 8/3/2017 11/8/2016   Q1: Little interest or pleasure in doing things 0 0   Q2: Feeling down, depressed or hopeless 0 0   PHQ-2 Score 0 0       Abuse: Current or Past(Physical, Sexual or Emotional)- No  Do you feel safe in your environment? Yes    Social History     Tobacco Use     Smoking status: Former Smoker     Types: Cigarettes     Last attempt to quit: 6/1/2015     Years since quitting: 3.6     Smokeless tobacco: Never Used   Substance Use Topics     Alcohol use: No     Alcohol/week: 0.0 oz     If you drink alcohol do you typically have >3 drinks per day or >7 drinks per week? No                      Last PSA: No results found for: PSA    Reviewed orders with patient. Reviewed health maintenance and updated orders accordingly - Yes  BP Readings from Last 3 Encounters:   02/06/19 122/74   01/29/18 140/90   11/22/17 125/82    Wt Readings from Last 3 Encounters:   02/06/19 97.1 kg (214 lb)   01/29/18 94.3 kg (208 lb)   11/22/17 90.3 kg (199 lb)                  Patient Active Problem List   Diagnosis     Elevated serum creatinine     Elevated liver enzymes     Family history of early CAD     Family history of diabetes mellitus     Obesity     Hyperlipidemia LDL goal <130     Vitamin D  deficiency     Prediabetes     Past Surgical History:   Procedure Laterality Date     NO HISTORY OF SURGERY         Social History     Tobacco Use     Smoking status: Former Smoker     Types: Cigarettes     Last attempt to quit: 2015     Years since quitting: 3.6     Smokeless tobacco: Never Used   Substance Use Topics     Alcohol use: No     Alcohol/week: 0.0 oz     Family History   Problem Relation Age of Onset     Diabetes Mother      Diabetes Father      Heart Disease Father         CAD   of MI age 58     C.A.D. Mother         CAD age 64         Current Outpatient Medications   Medication Sig Dispense Refill     blood glucose monitoring (ACCU-CHEK MG PLUS) meter device kit Use to test blood sugars one time daily or as directed. 1 kit 0     blood glucose monitoring (ACCU-CHEK MULTICLIX) lancets Use to test blood sugar once daily or as directed. 1 Box 6     Multiple Vitamins-Minerals (MULTIVITAMIN ADULT PO) Take by mouth daily       ONE TOUCH ULTRA test strip USE TO TEST ONE TIME DAILY OR AS DIRECTED 50 strip 3     vitamin D3 (CHOLECALCIFEROL) 20290 units (250 mcg) capsule Take 1 capsule by mouth daily       Allergies   Allergen Reactions     No Known Drug Allergies      Recent Labs   Lab Test 17  1028 17  0936 10/20/16  0900 16  0854 16  0805   A1C  --  5.8 5.8 5.8  --    * 113* 114* 97 107*   HDL 55 48 43 36* 34*   TRIG 121 174* 143 139 239*   ALT 44  --   --  52 70   CR 1.31* 1.43* 1.31* 1.29* 1.24   GFRESTIMATED 61 55* 61 63 66   GFRESTBLACK 74 67 74 76 80   POTASSIUM 4.3 4.4 4.5 4.3 4.3   TSH 5.81*  --   --   --   --         Reviewed and updated as needed this visit by clinical staff         Reviewed and updated as needed this visit by Provider        Past Medical History:   Diagnosis Date     Hyperlipidemia 2009     Hyperlipidemia LDL goal <130 2014     NO ACTIVE PROBLEMS      Vitamin D deficiency 8/3/2015      Past Surgical History:   Procedure Laterality  "Date     NO HISTORY OF SURGERY         ROS:  CONSTITUTIONAL: NEGATIVE for fever, chills, change in weight  INTEGUMENTARY/SKIN: NEGATIVE for worrisome rashes, moles or lesions  EYES: NEGATIVE for vision changes or irritation  ENT: NEGATIVE for ear, mouth and throat problems  RESP: NEGATIVE for significant cough or SOB  CV: NEGATIVE for chest pain, palpitations or peripheral edema  GI: NEGATIVE for nausea, abdominal pain, heartburn, or change in bowel habits   male: negative for dysuria, hematuria, decreased urinary stream, erectile dysfunction, urethral discharge  MUSCULOSKELETAL: NEGATIVE for significant arthralgias or myalgia  NEURO: NEGATIVE for weakness, dizziness or paresthesias  PSYCHIATRIC: NEGATIVE for changes in mood or affect    OBJECTIVE:   /74 (Cuff Size: Adult Regular)   Pulse 101   Temp 98.9  F (37.2  C) (Tympanic)   Resp 16   Ht 1.803 m (5' 11\")   Wt 97.1 kg (214 lb)   SpO2 98%   BMI 29.85 kg/m    EXAM:  GENERAL: healthy, alert and no distress  EYES: Eyes grossly normal to inspection, PERRL and conjunctivae and sclerae normal  HENT: ear canals and TM's normal, nose and mouth without ulcers or lesions  NECK: no adenopathy, no asymmetry, masses, or scars and thyroid normal to palpation  RESP: lungs clear to auscultation - no rales, rhonchi or wheezes  CV: regular rate and rhythm, normal S1 S2, no S3 or S4, no murmur, click or rub, no peripheral edema and peripheral pulses strong  ABDOMEN: soft, nontender, no hepatosplenomegaly, no masses and bowel sounds normal  MS: no gross musculoskeletal defects noted, no edema  SKIN: no suspicious lesions or rashes  NEURO: Normal strength and tone, mentation intact and speech normal  BACK: no CVA tenderness, no paralumbar tenderness  PSYCH: mentation appears normal, affect normal/bright  LYMPH: no cervical, supraclavicular, axillary, or inguinal adenopathy        ASSESSMENT/PLAN:   1. Healthcare maintenance    - CBC with platelets; Future  - " "Comprehensive metabolic panel; Future  - Lipid panel reflex to direct LDL Fasting; Future  - Hemoglobin A1c; Future  - TSH with free T4 reflex; Future    2. Dizziness  Started this morning, has fast HR and feeling thirsty  Has no other focal neurologic sign, has stable BS,   Unfortunately his wt hasn't been changed   EKG is sinus tachycardia  Orthostatic VS is showed possible sign of dehydration   Will review the lab and update pt   Encouraged him to keep working on rehydration   - EKG 12-lead complete w/read - Clinics    COUNSELING:  Reviewed preventive health counseling, as reflected in patient instructions    BP Readings from Last 1 Encounters:   02/06/19 120/80     Estimated body mass index is 29.85 kg/m  as calculated from the following:    Height as of this encounter: 1.803 m (5' 11\").    Weight as of this encounter: 97.1 kg (214 lb).           reports that he quit smoking about 3 years ago. His smoking use included cigarettes. he has never used smokeless tobacco.      Counseling Resources:  ATP IV Guidelines  Pooled Cohorts Equation Calculator  FRAX Risk Assessment  ICSI Preventive Guidelines  Dietary Guidelines for Americans, 2010  USDA's MyPlate  ASA Prophylaxis  Lung CA Screening    Samuel Landrum MD  [facility]  "

## 2019-02-07 DIAGNOSIS — Z00.00 HEALTHCARE MAINTENANCE: ICD-10-CM

## 2019-02-07 LAB
ALBUMIN SERPL-MCNC: 4.3 G/DL (ref 3.4–5)
ALP SERPL-CCNC: 64 U/L (ref 40–150)
ALT SERPL W P-5'-P-CCNC: 66 U/L (ref 0–70)
ANION GAP SERPL CALCULATED.3IONS-SCNC: 3 MMOL/L (ref 3–14)
AST SERPL W P-5'-P-CCNC: 32 U/L (ref 0–45)
BILIRUB SERPL-MCNC: 1.2 MG/DL (ref 0.2–1.3)
BUN SERPL-MCNC: 15 MG/DL (ref 7–30)
CALCIUM SERPL-MCNC: 9.6 MG/DL (ref 8.5–10.1)
CHLORIDE SERPL-SCNC: 102 MMOL/L (ref 94–109)
CHOLEST SERPL-MCNC: 206 MG/DL
CO2 SERPL-SCNC: 30 MMOL/L (ref 20–32)
CREAT SERPL-MCNC: 1.29 MG/DL (ref 0.66–1.25)
ERYTHROCYTE [DISTWIDTH] IN BLOOD BY AUTOMATED COUNT: 13.7 % (ref 10–15)
GFR SERPL CREATININE-BSD FRML MDRD: 69 ML/MIN/{1.73_M2}
GLUCOSE SERPL-MCNC: 112 MG/DL (ref 70–99)
HBA1C MFR BLD: 6.3 % (ref 0–5.6)
HCT VFR BLD AUTO: 49.1 % (ref 40–53)
HDLC SERPL-MCNC: 44 MG/DL
HGB BLD-MCNC: 16.7 G/DL (ref 13.3–17.7)
LDLC SERPL CALC-MCNC: 125 MG/DL
MCH RBC QN AUTO: 29.9 PG (ref 26.5–33)
MCHC RBC AUTO-ENTMCNC: 34 G/DL (ref 31.5–36.5)
MCV RBC AUTO: 88 FL (ref 78–100)
NONHDLC SERPL-MCNC: 162 MG/DL
PLATELET # BLD AUTO: 248 10E9/L (ref 150–450)
POTASSIUM SERPL-SCNC: 4.3 MMOL/L (ref 3.4–5.3)
PROT SERPL-MCNC: 7.6 G/DL (ref 6.8–8.8)
RBC # BLD AUTO: 5.58 10E12/L (ref 4.4–5.9)
SODIUM SERPL-SCNC: 135 MMOL/L (ref 133–144)
T4 FREE SERPL-MCNC: 0.8 NG/DL (ref 0.76–1.46)
TRIGL SERPL-MCNC: 187 MG/DL
TSH SERPL DL<=0.005 MIU/L-ACNC: 11.28 MU/L (ref 0.4–4)
WBC # BLD AUTO: 7.8 10E9/L (ref 4–11)

## 2019-02-07 PROCEDURE — 80061 LIPID PANEL: CPT | Performed by: FAMILY MEDICINE

## 2019-02-07 PROCEDURE — 85027 COMPLETE CBC AUTOMATED: CPT | Performed by: FAMILY MEDICINE

## 2019-02-07 PROCEDURE — 80053 COMPREHEN METABOLIC PANEL: CPT | Performed by: FAMILY MEDICINE

## 2019-02-07 PROCEDURE — 36415 COLL VENOUS BLD VENIPUNCTURE: CPT | Performed by: FAMILY MEDICINE

## 2019-02-07 PROCEDURE — 84439 ASSAY OF FREE THYROXINE: CPT | Performed by: FAMILY MEDICINE

## 2019-02-07 PROCEDURE — 84443 ASSAY THYROID STIM HORMONE: CPT | Performed by: FAMILY MEDICINE

## 2019-02-07 PROCEDURE — 83036 HEMOGLOBIN GLYCOSYLATED A1C: CPT | Performed by: FAMILY MEDICINE

## 2019-05-28 ENCOUNTER — ANCILLARY PROCEDURE (OUTPATIENT)
Dept: GENERAL RADIOLOGY | Facility: CLINIC | Age: 39
End: 2019-05-28
Attending: FAMILY MEDICINE
Payer: COMMERCIAL

## 2019-05-28 ENCOUNTER — OFFICE VISIT (OUTPATIENT)
Dept: ORTHOPEDICS | Facility: CLINIC | Age: 39
End: 2019-05-28
Payer: COMMERCIAL

## 2019-05-28 VITALS
SYSTOLIC BLOOD PRESSURE: 116 MMHG | BODY MASS INDEX: 29.96 KG/M2 | HEIGHT: 71 IN | WEIGHT: 214 LBS | DIASTOLIC BLOOD PRESSURE: 88 MMHG

## 2019-05-28 DIAGNOSIS — M25.562 ACUTE PAIN OF LEFT KNEE: ICD-10-CM

## 2019-05-28 DIAGNOSIS — M25.562 ACUTE PAIN OF LEFT KNEE: Primary | ICD-10-CM

## 2019-05-28 PROCEDURE — 73562 X-RAY EXAM OF KNEE 3: CPT | Mod: FY | Performed by: FAMILY MEDICINE

## 2019-05-28 PROCEDURE — 73562 X-RAY EXAM OF KNEE 3: CPT | Mod: FY

## 2019-05-28 PROCEDURE — 99203 OFFICE O/P NEW LOW 30 MIN: CPT | Performed by: FAMILY MEDICINE

## 2019-05-28 ASSESSMENT — MIFFLIN-ST. JEOR: SCORE: 1907.83

## 2019-05-28 NOTE — PROGRESS NOTES
[facility]   Clinic Visit s May 28, 2019    PCP: Samuel Landrum      Kwasi is a 38 year old male who is seen as self referral for   Chief Complaint   Patient presents with     Left Knee - Pain       Injury: Reports insidious onset without acute precipitating event.       Location of Pain: left knee anterior , nonradiating   Duration of Pain: 3 day(s)  Rating of Pain at worst: 7/10  Rating of Pain Currently: 3/10  Pain is better with: activity avoidance   Pain is worse with: bending and stair climbing  Treatment so far consists of: ibuprofen  Associated symptoms: swelling Mild  Recent imaging completed: No recent imaging completed.  Prior History of related problems: none    Social History: is employed as a/an IT       Past Medical History:   Diagnosis Date     Hyperlipidemia 2009     Hyperlipidemia LDL goal <130 2014     NO ACTIVE PROBLEMS      Vitamin D deficiency 8/3/2015       Patient Active Problem List    Diagnosis Date Noted     Prediabetes 10/13/2015     Priority: Medium     Vitamin D deficiency 2015     Priority: Medium     Hyperlipidemia LDL goal <130 2014     Priority: Medium     Elevated serum creatinine 2014     Priority: Medium     Elevated liver enzymes 2014     Priority: Medium     Family history of early CAD 2014     Priority: Medium     Family history of diabetes mellitus 2014     Priority: Medium     Obesity 2014     Priority: Medium       Family History   Problem Relation Age of Onset     Diabetes Mother      Diabetes Father      Heart Disease Father         CAD   of MI age 58     C.A.D. Mother         CAD age 64       Social History     Socioeconomic History     Marital status:      Spouse name: Not on file     Number of children: Not on file     Years of education: Not on file     Highest education level: Not on file   Occupational History     Not on file   Social Needs     Financial resource strain: Not  "on file     Food insecurity:     Worry: Not on file     Inability: Not on file     Transportation needs:     Medical: Not on file     Non-medical: Not on file   Tobacco Use     Smoking status: Former Smoker     Types: Cigarettes     Last attempt to quit: 6/1/2015     Years since quitting: 3.9     Smokeless tobacco: Never Used   Substance and Sexual Activity     Alcohol use: No     Alcohol/week: 0.0 oz     Drug use: No       Past Surgical History:   Procedure Laterality Date     NO HISTORY OF SURGERY               Review of Systems   Musculoskeletal: Positive for joint pain.   All other systems reviewed and are negative.        Physical Exam   Musculoskeletal:        Left knee: He exhibits effusion.     /88   Ht 1.803 m (5' 11\")   Wt 97.1 kg (214 lb)   BMI 29.85 kg/m    Constitutional:well-developed, well-nourished, and in no distress.   Cardiovascular: Intact distal pulses.    Neurological: alert. Gait Normal:   Gait, station, stance, and balance appear normal for age  Skin: Skin is warm and dry.   Psychiatric: Mood and affect normal.   Respiratory: unlabored, speaks in full sentences  Lymph: no LAD, no lymphangitis            Left Knee Exam     Muscle Strength   The patient has normal left knee strength.    Tenderness   The patient is experiencing tenderness in the medial joint line and lateral joint line.    Range of Motion   Extension: normal   Flexion: abnormal     Tests   David:  Medial - negative Lateral - negative  Varus: negative   Lachman:  Anterior - negative      Drawer:  Anterior - negative     Posterior - negative  Patellar apprehension: negative    Other   Erythema: absent  Scars: absent  Sensation: normal  Pulse: present  Swelling: mild  Effusion: effusion present          X-ray images Ordered and independently reviewed by me in the office today with the patient. X-ray shows:     Recent Results (from the past 744 hour(s))   XR Knee Standing AP Bilat [place] Bilat Lat Left    Narrative    " 5/28/2019    Tiny patellar osteophytes reflecting minimal degenerative change,   otherwise normal knees, with no acute fractures or soft tissue   deformities.       ASSESSMENT/PLAN    ICD-10-CM    1. Acute pain of left knee M25.562 XR Knee Standing AP Bilat [place] Bilat Lat Left     Acute knee pain over 3 days following heavy use working on lawn, with effusion.  Otherwise stable knee.  Radiographs essentially negative.  Differential diagnosis includes meniscus tear but also flareup from patellofemoral chondromalacia.  MRI discussed, patient opted to defer for now, instead will use scheduled ibuprofen 400 mg 3 times daily and cold packs 15 minutes 2-3 times daily and if not better in 1 to 2 weeks may call for MRI.

## 2019-05-28 NOTE — LETTER
2019         RE: Kwasi Gonzalez  [address]  [place] [address]        Dear Colleague,    Thank you for referring your patient, Kwasi Gonzalez, to the [facility]. Please see a copy of my visit note below.    HPI   [facility]   Clinic Visit s May 28, 2019    PCP: Samuel Landrum      Kwasi is a 38 year old male who is seen as self referral for   Chief Complaint   Patient presents with     Left Knee - Pain       Injury: Reports insidious onset without acute precipitating event.       Location of Pain: left knee anterior , nonradiating   Duration of Pain: 3 day(s)  Rating of Pain at worst: 7/10  Rating of Pain Currently: 3/10  Pain is better with: activity avoidance   Pain is worse with: bending and stair climbing  Treatment so far consists of: ibuprofen  Associated symptoms: swelling Mild  Recent imaging completed: No recent imaging completed.  Prior History of related problems: none    Social History: is employed as a/an IT       Past Medical History:   Diagnosis Date     Hyperlipidemia 2009     Hyperlipidemia LDL goal <130 2014     NO ACTIVE PROBLEMS      Vitamin D deficiency 8/3/2015       Patient Active Problem List    Diagnosis Date Noted     Prediabetes 10/13/2015     Priority: Medium     Vitamin D deficiency 2015     Priority: Medium     Hyperlipidemia LDL goal <130 2014     Priority: Medium     Elevated serum creatinine 2014     Priority: Medium     Elevated liver enzymes 2014     Priority: Medium     Family history of early CAD 2014     Priority: Medium     Family history of diabetes mellitus 2014     Priority: Medium     Obesity 2014     Priority: Medium       Family History   Problem Relation Age of Onset     Diabetes Mother      Diabetes Father      Heart Disease Father         CAD   of MI age 58     C.A.D. Mother         CAD age 64       Social History     Socioeconomic  "History     Marital status:      Spouse name: Not on file     Number of children: Not on file     Years of education: Not on file     Highest education level: Not on file   Occupational History     Not on file   Social Needs     Financial resource strain: Not on file     Food insecurity:     Worry: Not on file     Inability: Not on file     Transportation needs:     Medical: Not on file     Non-medical: Not on file   Tobacco Use     Smoking status: Former Smoker     Types: Cigarettes     Last attempt to quit: 6/1/2015     Years since quitting: 3.9     Smokeless tobacco: Never Used   Substance and Sexual Activity     Alcohol use: No     Alcohol/week: 0.0 oz     Drug use: No       Past Surgical History:   Procedure Laterality Date     NO HISTORY OF SURGERY               Review of Systems   Musculoskeletal: Positive for joint pain.   All other systems reviewed and are negative.        Physical Exam   Musculoskeletal:        Left knee: He exhibits effusion.     /88   Ht 1.803 m (5' 11\")   Wt 97.1 kg (214 lb)   BMI 29.85 kg/m     Constitutional:well-developed, well-nourished, and in no distress.   Cardiovascular: Intact distal pulses.    Neurological: alert. Gait Normal:   Gait, station, stance, and balance appear normal for age  Skin: Skin is warm and dry.   Psychiatric: Mood and affect normal.   Respiratory: unlabored, speaks in full sentences  Lymph: no LAD, no lymphangitis            Left Knee Exam     Muscle Strength   The patient has normal left knee strength.    Tenderness   The patient is experiencing tenderness in the medial joint line and lateral joint line.    Range of Motion   Extension: normal   Flexion: abnormal     Tests   David:  Medial - negative Lateral - negative  Varus: negative   Lachman:  Anterior - negative      Drawer:  Anterior - negative     Posterior - negative  Patellar apprehension: negative    Other   Erythema: absent  Scars: absent  Sensation: normal  Pulse: " present  Swelling: mild  Effusion: effusion present          X-ray images Ordered and independently reviewed by me in the office today with the patient. X-ray shows:     Recent Results (from the past 744 hour(s))   XR Knee Standing AP Bilat [place] Bilat Lat Left    Narrative    5/28/2019    Tiny patellar osteophytes reflecting minimal degenerative change,   otherwise normal knees, with no acute fractures or soft tissue   deformities.       ASSESSMENT/PLAN    ICD-10-CM    1. Acute pain of left knee M25.562 XR Knee Standing AP Bilat [place] Bilat Lat Left     Acute knee pain over 3 days following heavy use working on lawn, with effusion.  Otherwise stable knee.  Radiographs essentially negative.  Differential diagnosis includes meniscus tear but also flareup from patellofemoral chondromalacia.  MRI discussed, patient opted to defer for now, instead will use scheduled ibuprofen 400 mg 3 times daily and cold packs 15 minutes 2-3 times daily and if not better in 1 to 2 weeks may call for MRI.    Again, thank you for allowing me to participate in the care of your patient.        Sincerely,        Jared Wallace MD

## 2019-08-29 ENCOUNTER — OFFICE VISIT (OUTPATIENT)
Dept: FAMILY MEDICINE | Facility: CLINIC | Age: 39
End: 2019-08-29
Payer: COMMERCIAL

## 2019-08-29 VITALS
HEART RATE: 112 BPM | HEIGHT: 71 IN | BODY MASS INDEX: 28.84 KG/M2 | SYSTOLIC BLOOD PRESSURE: 122 MMHG | OXYGEN SATURATION: 98 % | TEMPERATURE: 98.7 F | WEIGHT: 206 LBS | DIASTOLIC BLOOD PRESSURE: 84 MMHG | RESPIRATION RATE: 18 BRPM

## 2019-08-29 DIAGNOSIS — K64.4 EXTERNAL HEMORRHOIDS: Primary | ICD-10-CM

## 2019-08-29 PROCEDURE — 99213 OFFICE O/P EST LOW 20 MIN: CPT | Performed by: FAMILY MEDICINE

## 2019-08-29 ASSESSMENT — MIFFLIN-ST. JEOR: SCORE: 1866.54

## 2019-08-29 NOTE — PROGRESS NOTES
Subjective     Kwasi Gonzalez is a 39 year old male who presents to clinic today for the following health issues:    HPI   Rectal Problem      Duration: 6 weeks     Description:   Pain: no   Itching: YES    Accompanying signs and symptoms:   Blood in stool: no   Changes in stool pattern: YES    History (similar episodes/previous evaluation): None    Precipitating or alleviating factors: Pt was on Keto and was experiencing constipation and is now suspecting hemorrhoids     Therapies tried and outcome: preparation H        Patient Active Problem List   Diagnosis     Elevated serum creatinine     Elevated liver enzymes     Family history of early CAD     Family history of diabetes mellitus     Obesity     Hyperlipidemia LDL goal <130     Vitamin D deficiency     Prediabetes     Past Surgical History:   Procedure Laterality Date     NO HISTORY OF SURGERY         Social History     Tobacco Use     Smoking status: Former Smoker     Types: Cigarettes     Last attempt to quit: 2015     Years since quittin.2     Smokeless tobacco: Never Used   Substance Use Topics     Alcohol use: No     Alcohol/week: 0.0 oz     Family History   Problem Relation Age of Onset     Diabetes Mother      Diabetes Father      Heart Disease Father         CAD   of MI age 58     C.A.D. Mother         CAD age 64         Current Outpatient Medications   Medication Sig Dispense Refill     hydrocortisone (ANUSOL-HC) 2.5 % cream Place rectally 2 times daily as needed for hemorrhoids 30 g 1     Multiple Vitamins-Minerals (MULTIVITAMIN ADULT PO) Take by mouth daily       blood glucose monitoring (ACCU-CHEK MG PLUS) meter device kit Use to test blood sugars one time daily or as directed. (Patient not taking: Reported on 2019) 1 kit 0     blood glucose monitoring (ACCU-CHEK MULTICLIX) lancets Use to test blood sugar once daily or as directed. (Patient not taking: Reported on 2019) 1 Box 6     ONE TOUCH ULTRA test strip USE TO TEST ONE  "TIME DAILY OR AS DIRECTED (Patient not taking: Reported on 5/28/2019) 50 strip 3     Allergies   Allergen Reactions     No Known Drug Allergies      Recent Labs   Lab Test 02/07/19  0813 11/22/17  1028 08/03/17  0936 10/20/16  0900 05/27/16  0854   A1C 6.3*  --  5.8 5.8 5.8   * 100* 113* 114* 97   HDL 44 55 48 43 36*   TRIG 187* 121 174* 143 139   ALT 66 44  --   --  52   CR 1.29* 1.31* 1.43* 1.31* 1.29*   GFRESTIMATED 69 61 55* 61 63   GFRESTBLACK 80 74 67 74 76   POTASSIUM 4.3 4.3 4.4 4.5 4.3   TSH 11.28* 5.81*  --   --   --       BP Readings from Last 3 Encounters:   08/29/19 122/84   05/28/19 116/88   02/06/19 112/78    Wt Readings from Last 3 Encounters:   08/29/19 93.4 kg (206 lb)   05/28/19 97.1 kg (214 lb)   02/06/19 97.1 kg (214 lb)         Reviewed and updated as needed this visit by Provider         Review of Systems   ROS COMP: Constitutional, HEENT, cardiovascular, pulmonary, gi and gu systems are negative, except as otherwise noted.      Objective    /84 (Cuff Size: Adult Large)   Pulse 112   Temp 98.7  F (37.1  C) (Tympanic)   Resp 18   Ht 1.803 m (5' 11\")   Wt 93.4 kg (206 lb)   SpO2 98%   BMI 28.73 kg/m    Body mass index is 28.73 kg/m .  Physical Exam   GENERAL: healthy, alert and no distress  NECK: no adenopathy, no asymmetry, masses, or scars and thyroid normal to palpation  RESP: lungs clear to auscultation - no rales, rhonchi or wheezes  CV: regular rate and rhythm, normal S1 S2, no S3 or S4, no murmur, click or rub, no peripheral edema and peripheral pulses strong  ABDOMEN: soft, nontender, no hepatosplenomegaly, no masses and bowel sounds normal  MS: no gross musculoskeletal defects noted, no edema            Assessment & Plan       ICD-10-CM    1. External hemorrhoids K64.4 hydrocortisone (ANUSOL-HC) 2.5 % cream      on his anus without thrombosis, will have him to try anusol for next 2 weeks  If not improving, will refer him to colorectal surgery   BMI:   Estimated body " "mass index is 28.73 kg/m  as calculated from the following:    Height as of this encounter: 1.803 m (5' 11\").    Weight as of this encounter: 93.4 kg (206 lb).           FUTURE APPOINTMENTS:       - Follow-up visit as needed     No follow-ups on file.    Samuel Landrum MD  [facility]        "

## 2019-09-09 NOTE — TELEPHONE ENCOUNTER
Test strips      Last Written Prescription Date: 9/26/16  Last Fill Quantity: 1,  # refills: 6   Last Office Visit with G, P or [facility] prescribing provider: 8/3/17    Jenny Valencia CMA                                                  no

## 2019-09-11 ENCOUNTER — THERAPY VISIT (OUTPATIENT)
Dept: PHYSICAL THERAPY | Facility: CLINIC | Age: 39
End: 2019-09-11
Payer: COMMERCIAL

## 2019-09-11 DIAGNOSIS — M25.562 LEFT KNEE PAIN: Primary | ICD-10-CM

## 2019-09-11 DIAGNOSIS — M25.561 RIGHT KNEE PAIN: ICD-10-CM

## 2019-09-11 PROCEDURE — 97110 THERAPEUTIC EXERCISES: CPT | Mod: GP | Performed by: PHYSICAL THERAPIST

## 2019-09-11 PROCEDURE — 97161 PT EVAL LOW COMPLEX 20 MIN: CPT | Mod: GP | Performed by: PHYSICAL THERAPIST

## 2019-09-11 ASSESSMENT — ACTIVITIES OF DAILY LIVING (ADL)
GO DOWN STAIRS: ACTIVITY IS SOMEWHAT DIFFICULT
PAIN: THE SYMPTOM AFFECTS MY ACTIVITY SEVERELY
AS_A_RESULT_OF_YOUR_KNEE_INJURY,_HOW_WOULD_YOU_RATE_YOUR_CURRENT_LEVEL_OF_DAILY_ACTIVITY?: ABNORMAL
STIFFNESS: THE SYMPTOM AFFECTS MY ACTIVITY SLIGHTLY
SWELLING: NOT ANSWERED
SQUAT: I AM UNABLE TO DO THE ACTIVITY
GO UP STAIRS: ACTIVITY IS SOMEWHAT DIFFICULT
KNEEL ON THE FRONT OF YOUR KNEE: ACTIVITY IS FAIRLY DIFFICULT
WEAKNESS: THE SYMPTOM AFFECTS MY ACTIVITY SLIGHTLY
HOW_WOULD_YOU_RATE_THE_OVERALL_FUNCTION_OF_YOUR_KNEE_DURING_YOUR_USUAL_DAILY_ACTIVITIES?: ABNORMAL
KNEE_ACTIVITY_OF_DAILY_LIVING_SUM: 24
STAND: ACTIVITY IS MINIMALLY DIFFICULT
RISE FROM A CHAIR: ACTIVITY IS VERY DIFFICULT
HOW_WOULD_YOU_RATE_THE_CURRENT_FUNCTION_OF_YOUR_KNEE_DURING_YOUR_USUAL_DAILY_ACTIVITIES_ON_A_SCALE_FROM_0_TO_100_WITH_100_BEING_YOUR_LEVEL_OF_KNEE_FUNCTION_PRIOR_TO_YOUR_INJURY_AND_0_BEING_THE_INABILITY_TO_PERFORM_ANY_OF_YOUR_USUAL_DAILY_ACTIVITIES?: 10
GIVING WAY, BUCKLING OR SHIFTING OF KNEE: NOT ANSWERED
LIMPING: NOT ANSWERED
SIT WITH YOUR KNEE BENT: ACTIVITY IS VERY DIFFICULT
WALK: ACTIVITY IS SOMEWHAT DIFFICULT

## 2019-09-26 ENCOUNTER — THERAPY VISIT (OUTPATIENT)
Dept: PHYSICAL THERAPY | Facility: CLINIC | Age: 39
End: 2019-09-26
Payer: COMMERCIAL

## 2019-09-26 DIAGNOSIS — M25.561 RIGHT KNEE PAIN: ICD-10-CM

## 2019-09-26 DIAGNOSIS — M25.562 LEFT KNEE PAIN: ICD-10-CM

## 2019-09-26 PROCEDURE — 97112 NEUROMUSCULAR REEDUCATION: CPT | Mod: GP | Performed by: PHYSICAL THERAPIST

## 2019-09-26 PROCEDURE — 97110 THERAPEUTIC EXERCISES: CPT | Mod: GP | Performed by: PHYSICAL THERAPIST

## 2019-10-22 ENCOUNTER — THERAPY VISIT (OUTPATIENT)
Dept: PHYSICAL THERAPY | Facility: CLINIC | Age: 39
End: 2019-10-22
Payer: COMMERCIAL

## 2019-10-22 DIAGNOSIS — M25.561 RIGHT KNEE PAIN: ICD-10-CM

## 2019-10-22 DIAGNOSIS — M25.562 LEFT KNEE PAIN: ICD-10-CM

## 2019-10-22 PROCEDURE — 97110 THERAPEUTIC EXERCISES: CPT | Mod: GP | Performed by: PHYSICAL THERAPIST

## 2019-10-22 PROCEDURE — 97112 NEUROMUSCULAR REEDUCATION: CPT | Mod: GP | Performed by: PHYSICAL THERAPIST

## 2019-11-09 ENCOUNTER — HEALTH MAINTENANCE LETTER (OUTPATIENT)
Age: 39
End: 2019-11-09

## 2020-02-23 ENCOUNTER — HEALTH MAINTENANCE LETTER (OUTPATIENT)
Age: 40
End: 2020-02-23

## 2020-03-17 PROBLEM — M25.562 LEFT KNEE PAIN: Status: RESOLVED | Noted: 2019-09-11 | Resolved: 2020-03-17

## 2020-03-17 PROBLEM — M25.561 RIGHT KNEE PAIN: Status: RESOLVED | Noted: 2019-09-11 | Resolved: 2020-03-17

## 2020-03-17 NOTE — PROGRESS NOTES
Discharge Note    Progress reporting period is from initial evaluation date (please see noted date below) to Oct 22, 2019.  Linked Episodes   Type: Episode: Status: Noted: Resolved: Last update: Updated by:   PHYSICAL THERAPY knee pain Active 9/11/2019  10/22/2019 11:44 AM Kirby Clifton, PT      Comments:       Kwasi failed to follow up and current status is unknown.  Please see information below for last relevant information on current status.  Patient seen for 3 visits.    SUBJECTIVE  Subjective changes noted by patient:  reports the the knee is feeling better but can still have pain in the knee with stairs and getting out of a low chair  .  Current pain level is 5/10.     Previous pain level was  5/10.   Changes in function:  Yes (See Goal flowsheet attached for changes in current functional level)  Adverse reaction to treatment or activity: None    OBJECTIVE  Changes noted in objective findings: Hip ABD = 4/5, Hip ER = 5-/5, Hip Ext = 4/5     ASSESSMENT/PLAN  Diagnosis: Bilateral Knee Pain   Updated problem list and treatment plan:   Pain - HEP  STG/LTGs have been met or progress has been made towards goals:  Yes, please see goal flowsheet for most current information  Assessment of Progress: current status is unknown.    Last current status: Pt is progressing as expected   Self Management Plans:  HEP  I have re-evaluated this patient and find that the nature, scope, duration and intensity of the therapy is appropriate for the medical condition of the patient.  Kwasi continues to require the following intervention to meet STG and LTG's:  HEP.    Recommendations:  Discharge with current home program.  Patient to follow up with MD as needed.    Please refer to the daily flowsheet for treatment today, total treatment time and time spent performing 1:1 timed codes.

## 2020-08-14 ENCOUNTER — OFFICE VISIT (OUTPATIENT)
Dept: ORTHOPEDICS | Facility: CLINIC | Age: 40
End: 2020-08-14
Payer: COMMERCIAL

## 2020-08-14 ENCOUNTER — ANCILLARY PROCEDURE (OUTPATIENT)
Dept: GENERAL RADIOLOGY | Facility: CLINIC | Age: 40
End: 2020-08-14
Attending: ORTHOPAEDIC SURGERY
Payer: COMMERCIAL

## 2020-08-14 VITALS
HEIGHT: 71 IN | SYSTOLIC BLOOD PRESSURE: 120 MMHG | DIASTOLIC BLOOD PRESSURE: 82 MMHG | BODY MASS INDEX: 30.02 KG/M2 | WEIGHT: 214.4 LBS

## 2020-08-14 DIAGNOSIS — S53.402A SPRAIN OF LEFT ELBOW, INITIAL ENCOUNTER: ICD-10-CM

## 2020-08-14 DIAGNOSIS — M25.522 LEFT ELBOW PAIN: Primary | ICD-10-CM

## 2020-08-14 DIAGNOSIS — S52.122A CLOSED DISPLACED FRACTURE OF HEAD OF LEFT RADIUS, INITIAL ENCOUNTER: ICD-10-CM

## 2020-08-14 DIAGNOSIS — M25.522 LEFT ELBOW PAIN: ICD-10-CM

## 2020-08-14 PROCEDURE — 73080 X-RAY EXAM OF ELBOW: CPT | Mod: LT | Performed by: ORTHOPAEDIC SURGERY

## 2020-08-14 PROCEDURE — 24650 CLTX RDL HEAD/NCK FX WO MNPJ: CPT | Mod: LT | Performed by: ORTHOPAEDIC SURGERY

## 2020-08-14 PROCEDURE — 99203 OFFICE O/P NEW LOW 30 MIN: CPT | Mod: 57 | Performed by: ORTHOPAEDIC SURGERY

## 2020-08-14 ASSESSMENT — MIFFLIN-ST. JEOR: SCORE: 1899.64

## 2020-08-14 NOTE — PATIENT INSTRUCTIONS
Patient Education     Elbow Fracture    You have a break, or fracture, of the elbow. That means you have a crack or break in one or more of the bones of the elbow joint. Fractures usually take 4 to 12 weeks to heal, depending on the type. Initial treatment is with a splint or cast. Severe fractures may need surgery to put the bone fragments back into place.  The elbow joint is formed by 3 arm bones:    Radius. This is the bone on the thumb side of the forearm.    Ulna. This is the bone on the little-finger side of the forearm. The ulna forms the tip of the elbow.    Humerus. This is the upper arm bone that connects to the shoulder.  Home care    Apply an ice pack over the injured area for 15 to 20 minutes every 3 to 6 hours. You should do this for the first 24 to 48 hours. To make an ice pack, put ice cubes in a plastic bag that seals at the top. Wrap the bag in a clean, thin towel or cloth. Never put ice or an ice pack directly on your skin. You can place the ice pack inside the sling and directly over the splint. Keep using ice packs to ease pain and swelling as needed. As the ice melts, be careful to not get your wrap, splint, or cast wet. After 48 hours, apply heat (warm shower or warm bath) for 15 to 20 minutes several times a day. Or switch between ice and heat.    If you were given a sling and splint, leave it in place for the time advised. Keep the splint completely dry at all times. Bathe with your splint out of the water protected with 2 large plastic bags, one outside of the other, each sealed with duct tape or rubber bands at the top end. If a fiberglass splint or cast gets wet, you can dry it with a hair dryer on a cool setting.    Wear the arm sling at all times.    You may use over-the-counter pain medicine to control pain, unless another pain medicine was prescribed. If you have chronic liver or kidney disease or ever had a stomach ulcer or GI (gastrointestinal) bleeding, talk with your healthcare  provider about using these medicines.    Follow-up care  Follow up with your healthcare provider in 2 weeks  Avoid rotation movement  When to seek medical advice  Call your healthcare provider right away if any of these occur:    The plaster splint or cast becomes wet or soft    The splint or cast becomes loose    The fiberglass splint or cast remains wet for more than 24 hours    Increased tightness or pain develops in the elbow    A bad smell comes from the cast or splint    Fingers become swollen, cold, blue, numb, or tingly  Date Last Reviewed: 4/1/2018 2000-2019 [employer]. [address] [place] [address]. All rights reserved. This information is not intended as a substitute for professional medical care. Always follow your healthcare professional's instructions.

## 2020-08-14 NOTE — PROGRESS NOTES
HISTORY OF PRESENT ILLNESS:    Kwasi Gonzalez is a 40 year old male who is seen as self referral for left elbow injury due to fall while playing with his children, FOOSH mechanism. Pain located in the left elbow. Patient is right hand dominant. Patient currently working as  in IT field.     Present symptoms: left lateral and posterior elbow pain. Pain is throbbing. Patient reports difficulties extended the elbow, and also rasing his arm overhead due to pain.  Pain also with supinating at the elbow. Patient able to sleep last night. No numbness or tingling present.   Current pain level: 7/10   Treatments tried to this point: icing, Ibuprofen, antalgesic spray  Orthopedic PMH: left humerus fracture - conservative managment     Past Medical History:   Diagnosis Date     Hyperlipidemia 2009     Hyperlipidemia LDL goal <130 2014     NO ACTIVE PROBLEMS      Vitamin D deficiency 8/3/2015       Past Surgical History:   Procedure Laterality Date     NO HISTORY OF SURGERY         Family History   Problem Relation Age of Onset     Diabetes Mother      Diabetes Father      Heart Disease Father         CAD   of MI age 58     C.A.D. Mother         CAD age 64       Social History     Socioeconomic History     Marital status:      Spouse name: Not on file     Number of children: Not on file     Years of education: Not on file     Highest education level: Not on file   Occupational History     Not on file   Social Needs     Financial resource strain: Not on file     Food insecurity     Worry: Not on file     Inability: Not on file     Transportation needs     Medical: Not on file     Non-medical: Not on file   Tobacco Use     Smoking status: Former Smoker     Types: Cigarettes     Last attempt to quit: 2015     Years since quittin.2     Smokeless tobacco: Never Used   Substance and Sexual Activity     Alcohol use: No     Alcohol/week: 0.0 standard drinks     Drug use: No     Sexual  activity: Yes     Partners: Female   Lifestyle     Physical activity     Days per week: Not on file     Minutes per session: Not on file     Stress: Not on file   Relationships     Social connections     Talks on phone: Not on file     Gets together: Not on file     Attends Yarsanism service: Not on file     Active member of club or organization: Not on file     Attends meetings of clubs or organizations: Not on file     Relationship status: Not on file     Intimate partner violence     Fear of current or ex partner: Not on file     Emotionally abused: Not on file     Physically abused: Not on file     Forced sexual activity: Not on file   Other Topics Concern      Service No     Blood Transfusions No     Caffeine Concern No     Occupational Exposure No     Hobby Hazards No     Sleep Concern Not Asked     Stress Concern No     Weight Concern No     Special Diet No     Back Care No     Exercise Yes     Bike Helmet No     Comment: does not ride a bike     Seat Belt Yes     Self-Exams Not Asked     Parent/sibling w/ CABG, MI or angioplasty before 65F 55M? Not Asked   Social History Narrative     Not on file       Current Outpatient Medications   Medication Sig Dispense Refill     blood glucose monitoring (ACCU-CHEK MG PLUS) meter device kit Use to test blood sugars one time daily or as directed. (Patient not taking: Reported on 5/28/2019) 1 kit 0     blood glucose monitoring (ACCU-CHEK MULTICLIX) lancets Use to test blood sugar once daily or as directed. (Patient not taking: Reported on 5/28/2019) 1 Box 6     hydrocortisone (ANUSOL-HC) 2.5 % cream Place rectally 2 times daily as needed for hemorrhoids (Patient not taking: Reported on 8/14/2020) 30 g 1     Multiple Vitamins-Minerals (MULTIVITAMIN ADULT PO) Take by mouth daily       ONE TOUCH ULTRA test strip USE TO TEST ONE TIME DAILY OR AS DIRECTED (Patient not taking: Reported on 5/28/2019) 50 strip 3       Allergies   Allergen Reactions     No Known Drug  "Allergies        REVIEW OF SYSTEMS:  CONSTITUTIONAL:  NEGATIVE for fever, chills, change in weight  INTEGUMENTARY/SKIN:  NEGATIVE for worrisome rashes, moles or lesions  EYES:  NEGATIVE for vision changes or irritation  ENT/MOUTH:  NEGATIVE for ear, mouth and throat problems  RESP:  NEGATIVE for significant cough or SOB  BREAST:  NEGATIVE for masses, tenderness or discharge  CV:  NEGATIVE for chest pain, palpitations or peripheral edema  GI:  NEGATIVE for nausea, abdominal pain, heartburn, or change in bowel habits  :  Negative   MUSCULOSKELETAL:  See HPI above  NEURO:  NEGATIVE for weakness, dizziness or paresthesias  ENDOCRINE:  NEGATIVE for temperature intolerance, skin/hair changes  HEME/ALLERGY/IMMUNE:  NEGATIVE for bleeding problems  PSYCHIATRIC:  NEGATIVE for changes in mood or affect      PHYSICAL EXAM:  /82 (BP Location: Right arm, Patient Position: Chair, Cuff Size: Adult Regular)   Ht 1.803 m (5' 11\")   Wt 97.3 kg (214 lb 6.4 oz)   BMI 29.90 kg/m    Body mass index is 29.9 kg/m .   GENERAL APPEARANCE: healthy, alert and no distress   HEENT: No apparent thyroid megaly. Clear sclera with normal ocular movement  RESPIRATORY: No labored breathing  SKIN: no suspicious lesions or rashes  NEURO: Normal strength and tone, mentation intact and speech normal  VASCULAR: Good pulses, and capillary refill   LYMPH: no lymphadenopathy   PSYCH:  mentation appears normal and affect normal/bright    MUSCULOSKELETAL:  Minimal swelling of the left elbow  Focal tenderness mostly at the medial epicondylar and anteromedial aspect of the elbow  Minimal to mild tenderness at the radial head region  Rotational movement is well-tolerated  Full extension is painful  Raising of the arm above the chest level is quite painful again localized to the medial aspect  No ecchymosis noted  Skin is intact  Circulation is intact  Wrist is nontender  Wrist range of motion is full  Finger movement is full     ASSESSMENT:  Radial head " fracture, left elbow with additional avulsion fracture indicating sprain injury    PLAN:  The x-rays from today were visualized.  The elbow itself is reduced.  Minimal to mildly displaced radial head fracture is present.  On the lateral view there is another small a avulsion fragment which does not appear to be coming from the tip of the coronoid process.  This may represent a avulsion fracture from capsular disruption or ligamentous disruption.  It does not appear to be a part of terrible triad injury.  Nonoperative treatment is recommended.  Will start with a sling and will obtain new x-rays in 2 weeks.  He was informed to avoid pronation supination movement which will last probably for 4 weeks.  We will consider flexion and extension when he returns to clinic next time.    Imaging Interpretation:   Minimal to mildly displaced radial head fracture along with small a avulsion fracture whose origin is not clear.  The elbow is reduced.      Clark Vaughn MD  Department of Orthopedic Surgery        Disclaimer: This note consists of symbols derived from keyboarding, dictation and/or voice recognition software. As a result, there may be errors in the script that have gone undetected. Please consider this when interpreting information found in this chart.

## 2020-08-14 NOTE — LETTER
2020         RE: Kwasi Gonzalez  [address]  [place] [address]        Dear Colleague,    Thank you for referring your patient, Kwasi Gonzalez, to the [facility] ORTHOPEDIC SURGERY. Please see a copy of my visit note below.    HISTORY OF PRESENT ILLNESS:    Kwasi Gonzalez is a 40 year old male who is seen as self referral for left elbow injury due to fall while playing with his children, FOOSH mechanism. Pain located in the left elbow. Patient is right hand dominant. Patient currently working as  in [employer] field.     Present symptoms: left lateral and posterior elbow pain. Pain is throbbing. Patient reports difficulties extended the elbow, and also rasing his arm overhead due to pain.  Pain also with supinating at the elbow. Patient able to sleep last night. No numbness or tingling present.   Current pain level: 7/10   Treatments tried to this point: icing, Ibuprofen, antalgesic spray  Orthopedic PMH: left humerus fracture - conservative managment     Past Medical History:   Diagnosis Date     Hyperlipidemia 2009     Hyperlipidemia LDL goal <130 2014     NO ACTIVE PROBLEMS      Vitamin D deficiency 8/3/2015       Past Surgical History:   Procedure Laterality Date     NO HISTORY OF SURGERY         Family History   Problem Relation Age of Onset     Diabetes Mother      Diabetes Father      Heart Disease Father         CAD   of MI age 58     C.A.D. Mother         CAD age 64       Social History     Socioeconomic History     Marital status:      Spouse name: Not on file     Number of children: Not on file     Years of education: Not on file     Highest education level: Not on file   Occupational History     Not on file   Social Needs     Financial resource strain: Not on file     Food insecurity     Worry: Not on file     Inability: Not on file     Transportation needs     Medical: Not on file     Non-medical: Not on file   Tobacco Use     Smoking status:  Former Smoker     Types: Cigarettes     Last attempt to quit: 2015     Years since quittin.2     Smokeless tobacco: Never Used   Substance and Sexual Activity     Alcohol use: No     Alcohol/week: 0.0 standard drinks     Drug use: No     Sexual activity: Yes     Partners: Female   Lifestyle     Physical activity     Days per week: Not on file     Minutes per session: Not on file     Stress: Not on file   Relationships     Social connections     Talks on phone: Not on file     Gets together: Not on file     Attends Jain service: Not on file     Active member of club or organization: Not on file     Attends meetings of clubs or organizations: Not on file     Relationship status: Not on file     Intimate partner violence     Fear of current or ex partner: Not on file     Emotionally abused: Not on file     Physically abused: Not on file     Forced sexual activity: Not on file   Other Topics Concern      Service No     Blood Transfusions No     Caffeine Concern No     Occupational Exposure No     Hobby Hazards No     Sleep Concern Not Asked     Stress Concern No     Weight Concern No     Special Diet No     Back Care No     Exercise Yes     Bike Helmet No     Comment: does not ride a bike     Seat Belt Yes     Self-Exams Not Asked     Parent/sibling w/ CABG, MI or angioplasty before 65F 55M? Not Asked   Social History Narrative     Not on file       Current Outpatient Medications   Medication Sig Dispense Refill     blood glucose monitoring (ACCU-CHEK MG PLUS) meter device kit Use to test blood sugars one time daily or as directed. (Patient not taking: Reported on 2019) 1 kit 0     blood glucose monitoring (ACCU-CHEK MULTICLIX) lancets Use to test blood sugar once daily or as directed. (Patient not taking: Reported on 2019) 1 Box 6     hydrocortisone (ANUSOL-HC) 2.5 % cream Place rectally 2 times daily as needed for hemorrhoids (Patient not taking: Reported on 2020) 30 g 1      "Multiple Vitamins-Minerals (MULTIVITAMIN ADULT PO) Take by mouth daily       ONE TOUCH ULTRA test strip USE TO TEST ONE TIME DAILY OR AS DIRECTED (Patient not taking: Reported on 5/28/2019) 50 strip 3       Allergies   Allergen Reactions     No Known Drug Allergies        REVIEW OF SYSTEMS:  CONSTITUTIONAL:  NEGATIVE for fever, chills, change in weight  INTEGUMENTARY/SKIN:  NEGATIVE for worrisome rashes, moles or lesions  EYES:  NEGATIVE for vision changes or irritation  ENT/MOUTH:  NEGATIVE for ear, mouth and throat problems  RESP:  NEGATIVE for significant cough or SOB  BREAST:  NEGATIVE for masses, tenderness or discharge  CV:  NEGATIVE for chest pain, palpitations or peripheral edema  GI:  NEGATIVE for nausea, abdominal pain, heartburn, or change in bowel habits  :  Negative   MUSCULOSKELETAL:  See HPI above  NEURO:  NEGATIVE for weakness, dizziness or paresthesias  ENDOCRINE:  NEGATIVE for temperature intolerance, skin/hair changes  HEME/ALLERGY/IMMUNE:  NEGATIVE for bleeding problems  PSYCHIATRIC:  NEGATIVE for changes in mood or affect      PHYSICAL EXAM:  /82 (BP Location: Right arm, Patient Position: Chair, Cuff Size: Adult Regular)   Ht 1.803 m (5' 11\")   Wt 97.3 kg (214 lb 6.4 oz)   BMI 29.90 kg/m    Body mass index is 29.9 kg/m .   GENERAL APPEARANCE: healthy, alert and no distress   HEENT: No apparent thyroid megaly. Clear sclera with normal ocular movement  RESPIRATORY: No labored breathing  SKIN: no suspicious lesions or rashes  NEURO: Normal strength and tone, mentation intact and speech normal  VASCULAR: Good pulses, and capillary refill   LYMPH: no lymphadenopathy   PSYCH:  mentation appears normal and affect normal/bright    MUSCULOSKELETAL:  Minimal swelling of the left elbow  Focal tenderness mostly at the medial epicondylar and anteromedial aspect of the elbow  Minimal to mild tenderness at the radial head region  Rotational movement is well-tolerated  Full extension is " painful  Raising of the arm above the chest level is quite painful again localized to the medial aspect  No ecchymosis noted  Skin is intact  Circulation is intact  Wrist is nontender  Wrist range of motion is full  Finger movement is full     ASSESSMENT:  Radial head fracture, left elbow with additional avulsion fracture indicating sprain injury    PLAN:  The x-rays from today were visualized.  The elbow itself is reduced.  Minimal to mildly displaced radial head fracture is present.  On the lateral view there is another small a avulsion fragment which does not appear to be coming from the tip of the coronoid process.  This may represent a avulsion fracture from capsular disruption or ligamentous disruption.  It does not appear to be a part of terrible triad injury.  Nonoperative treatment is recommended.  Will start with a sling and will obtain new x-rays in 2 weeks.  He was informed to avoid pronation supination movement which will last probably for 4 weeks.  We will consider flexion and extension when he returns to clinic next time.    Imaging Interpretation:   Minimal to mildly displaced radial head fracture along with small a avulsion fracture whose origin is not clear.  The elbow is reduced.      Clark Vaughn MD  Department of Orthopedic Surgery        Disclaimer: This note consists of symbols derived from keyboarding, dictation and/or voice recognition software. As a result, there may be errors in the script that have gone undetected. Please consider this when interpreting information found in this chart.        Again, thank you for allowing me to participate in the care of your patient.        Sincerely,        Clark Vaughn MD

## 2020-08-28 ENCOUNTER — ANCILLARY PROCEDURE (OUTPATIENT)
Dept: GENERAL RADIOLOGY | Facility: CLINIC | Age: 40
End: 2020-08-28
Attending: ORTHOPAEDIC SURGERY
Payer: COMMERCIAL

## 2020-08-28 ENCOUNTER — OFFICE VISIT (OUTPATIENT)
Dept: ORTHOPEDICS | Facility: CLINIC | Age: 40
End: 2020-08-28
Payer: COMMERCIAL

## 2020-08-28 VITALS
DIASTOLIC BLOOD PRESSURE: 84 MMHG | SYSTOLIC BLOOD PRESSURE: 124 MMHG | WEIGHT: 214 LBS | BODY MASS INDEX: 29.96 KG/M2 | HEIGHT: 71 IN

## 2020-08-28 DIAGNOSIS — S52.122D CLOSED DISPLACED FRACTURE OF HEAD OF LEFT RADIUS WITH ROUTINE HEALING, SUBSEQUENT ENCOUNTER: Primary | ICD-10-CM

## 2020-08-28 DIAGNOSIS — S52.122D CLOSED DISPLACED FRACTURE OF HEAD OF LEFT RADIUS WITH ROUTINE HEALING, SUBSEQUENT ENCOUNTER: ICD-10-CM

## 2020-08-28 PROCEDURE — 73080 X-RAY EXAM OF ELBOW: CPT | Mod: LT | Performed by: ORTHOPAEDIC SURGERY

## 2020-08-28 PROCEDURE — 99207 ZZC FRACTURE CARE IN GLOBAL PERIOD: CPT | Performed by: ORTHOPAEDIC SURGERY

## 2020-08-28 ASSESSMENT — MIFFLIN-ST. JEOR: SCORE: 1897.83

## 2020-08-28 NOTE — LETTER
"    8/28/2020         RE: Kwasi Gonzalez  [address]  [place] [address]        Dear Colleague,    Thank you for referring your patient, Kwasi Gonzalez, to the [facility] ORTHOPEDIC SURGERY. Please see a copy of my visit note below.    HISTORY OF PRESENT ILLNESS:    Kwasi Gonzalez is a 40 year old male who is seen in follow up for left radial head fracture. Injury occurred on 8/13/20, he is 2 weeks.  Present symptoms: Patient reports no pain in the elbow today. He states he will have mild discomfort with end range of motion in extension, or at nighttime. He has been compliant with use of sling. He denies any further injury or trauma to the left elbow since his last office visit.   Treatments tried to this point: sling, no recent use of ibuprofen or Tylenol     Past Medical History: Unchanged from the visit of 8/14/20. Please refer to that note.       REVIEW OF SYSTEMS:  CONSTITUTIONAL:  NEGATIVE for fever, chills, change in weight  INTEGUMENTARY/SKIN:  NEGATIVE for worrisome rashes, moles or lesions  EYES:  NEGATIVE for vision changes or irritation  ENT/MOUTH:  NEGATIVE for ear, mouth and throat problems  RESP:  NEGATIVE for significant cough or SOB  BREAST:  NEGATIVE for masses, tenderness or discharge  CV:  NEGATIVE for chest pain, palpitations or peripheral edema  GI:  NEGATIVE for nausea, abdominal pain, heartburn, or change in bowel habits  :  Negative   MUSCULOSKELETAL:  See HPI above  NEURO:  NEGATIVE for weakness, dizziness or paresthesias  ENDOCRINE:  NEGATIVE for temperature intolerance, skin/hair changes  HEME/ALLERGY/IMMUNE:  NEGATIVE for bleeding problems  PSYCHIATRIC:  NEGATIVE for changes in mood or affect    PHYSICAL EXAM:  /84 (BP Location: Right arm, Patient Position: Chair, Cuff Size: Adult Regular)   Ht 1.803 m (5' 11\")   Wt 97.1 kg (214 lb)   BMI 29.85 kg/m    Body mass index is 29.85 kg/m .   GENERAL APPEARANCE: healthy, alert and no distress   SKIN: no suspicious " lesions or rashes  NEURO: Normal strength and tone, mentation intact and speech normal  VASCULAR:  good pulses, and cappillary refill   LYMPH: no lymphadenopathy   PSYCH:  mentation appears normal and affect normal/bright    MSK:  Not in acute distress  Normal gait  No gross deformities of the left elbow  No noticeable swelling  Distal neurovascular status is intact  Mild tenderness persisting at the radial head region  Lacking 15 degrees of terminal flexion and 15 degrees of terminal extension of the left elbow  Pronation supination was not checked today    IMAGING INTERPRETATION: Unchanged from previous x-rays of August 14, 2020.     ASSESSMENT:  Elbow sprain injury and radial head injury    PLAN:  We visualized x-rays of the left elbow and compared them to the previous x-rays as mentioned above.  As we outlined on his last visit, we will let him start on range of motion exercises of flexion and extension only not supination and pronation.  Specific exercises were demonstrated today.  We will follow-up with him in 3 weeks with a new x-rays.  Most likely at that point, we will let him start doing pronation/supination.           Clark Vaughn MD  Dept. Orthopedic Surgery  [facility]       Disclaimer: This note consists of symbols derived from keyboarding, dictation and/or voice recognition software. As a result, there may be errors in the script that have gone undetected. Please consider this when interpreting information found in this chart.      Again, thank you for allowing me to participate in the care of your patient.        Sincerely,        Clark Vaughn MD

## 2020-09-18 ENCOUNTER — OFFICE VISIT (OUTPATIENT)
Dept: ORTHOPEDICS | Facility: CLINIC | Age: 40
End: 2020-09-18
Payer: COMMERCIAL

## 2020-09-18 ENCOUNTER — ANCILLARY PROCEDURE (OUTPATIENT)
Dept: GENERAL RADIOLOGY | Facility: CLINIC | Age: 40
End: 2020-09-18
Attending: ORTHOPAEDIC SURGERY
Payer: COMMERCIAL

## 2020-09-18 VITALS
BODY MASS INDEX: 29.96 KG/M2 | WEIGHT: 214 LBS | SYSTOLIC BLOOD PRESSURE: 112 MMHG | DIASTOLIC BLOOD PRESSURE: 74 MMHG | HEIGHT: 71 IN

## 2020-09-18 DIAGNOSIS — S52.122D CLOSED DISPLACED FRACTURE OF HEAD OF LEFT RADIUS WITH ROUTINE HEALING, SUBSEQUENT ENCOUNTER: Primary | ICD-10-CM

## 2020-09-18 DIAGNOSIS — S52.122D CLOSED DISPLACED FRACTURE OF HEAD OF LEFT RADIUS WITH ROUTINE HEALING, SUBSEQUENT ENCOUNTER: ICD-10-CM

## 2020-09-18 PROCEDURE — 73080 X-RAY EXAM OF ELBOW: CPT | Mod: LT | Performed by: ORTHOPAEDIC SURGERY

## 2020-09-18 PROCEDURE — 99207 ZZC FRACTURE CARE IN GLOBAL PERIOD: CPT | Performed by: ORTHOPAEDIC SURGERY

## 2020-09-18 ASSESSMENT — MIFFLIN-ST. JEOR: SCORE: 1897.83

## 2020-09-18 NOTE — PATIENT INSTRUCTIONS
Continue stretching exercises  Once range of motion is restored, he can return to full activities which will be about 3 to 4 weeks  Follow-up as needed otherwise

## 2020-09-18 NOTE — LETTER
9/18/2020         RE: Kwasi Gonzalez  [address] [address]  [place] [address]        Dear Colleague,    Thank you for referring your patient, Kwasi Gonzalez, to the [facility] ORTHOPEDIC SURGERY. Please see a copy of my visit note below.    HISTORY OF PRESENT ILLNESS:    Kwasi Gonzalez is a 40 year old male who is seen in follow up for left radial head fracture, 8/13/20, he is 5 weeks out from injury.  Since his last office visit on 8/28/20 he has tapered out of his simple sling. He has been working on gentle range of motion of the elbow, and feels stiffness with flexing the elbow. His elbow flexion is limited due to discomfort, he does not feel mechanical block at end range.   Patient has been using his extremity more regularly throughout the day, but is cautious with activities and not yet 100%.    He reports his average pain level throughout the day 2/10.  Treatments tried to this point: range of motion exercises, has discontinued use of sling and OTC medication.  Past Medical History: Unchanged from the visit of 8/14/20. Please refer to that note.  REVIEW OF SYSTEMS:  CONSTITUTIONAL:  NEGATIVE for fever, chills, change in weight  INTEGUMENTARY/SKIN:  NEGATIVE for worrisome rashes, moles or lesions  EYES:  NEGATIVE for vision changes or irritation  ENT/MOUTH:  NEGATIVE for ear, mouth and throat problems  RESP:  NEGATIVE for significant cough or SOB  BREAST:  NEGATIVE for masses, tenderness or discharge  CV:  NEGATIVE for chest pain, palpitations or peripheral edema  GI:  NEGATIVE for nausea, abdominal pain, heartburn, or change in bowel habits  :  Negative   MUSCULOSKELETAL:  See HPI above  NEURO:  NEGATIVE for weakness, dizziness or paresthesias  ENDOCRINE:  NEGATIVE for temperature intolerance, skin/hair changes  HEME/ALLERGY/IMMUNE:  NEGATIVE for bleeding problems  PSYCHIATRIC:  NEGATIVE for changes in mood or affect     PHYSICAL EXAM:  /74 (BP Location: Right arm, Patient Position: Chair,  "Cuff Size: Adult Regular)   Ht 1.803 m (5' 11\")   Wt 97.1 kg (214 lb)   BMI 29.85 kg/m    Body mass index is 29.85 kg/m .   GENERAL APPEARANCE: healthy, alert and no distress   SKIN: no suspicious lesions or rashes  NEURO: Normal strength and tone, mentation intact and speech normal  VASCULAR:  good pulses, and cappillary refill   LYMPH: no lymphadenopathy   PSYCH:  mentation appears normal and affect normal/bright    MSK:  Not in acute distress  Normal gait     No deformities of the left upper extremity compared to right  Left elbow range of motion is lacking by 10 degrees in terms of flexion and 5 degrees from an terms of extension  Pronation and supination is quite well-maintained surprisingly  No pain at the radial head and neck region  Distal neurovascular status is intact  No swelling or erythema in the elbow region    IMAGING INTERPRETATION: Well-maintained slightly displaced radial head fracture, left elbow no further significant displacement from the previous x-rays      ASSESSMENT:  Left radial head fracture with slight limitation of range of motion    PLAN:  Continuation of stretching exercises was emphasized.  He should avoid strengthening until range of motion is improved near full.  However, even if he is stuck at the current level of range of motion, very likely he will have full functionality of the arm without significant limitation.  Once his range of motion is back to normal, he can resume all activities which I assume to be in 3 to 4 weeks  He can follow-up on as-needed basis.           Clark Vaughn MD  Dept. Orthopedic Surgery  [facility]       Disclaimer: This note consists of symbols derived from keyboarding, dictation and/or voice recognition software. As a result, there may be errors in the script that have gone undetected. Please consider this when interpreting information found in this chart.      Again, thank you for allowing me to participate in the care of your patient.  "       Sincerely,        Clark Vaughn MD

## 2020-09-18 NOTE — PROGRESS NOTES
"HISTORY OF PRESENT ILLNESS:    Kwasi Gonzalez is a 40 year old male who is seen in follow up for left radial head fracture, 8/13/20, he is 5 weeks out from injury.  Since his last office visit on 8/28/20 he has tapered out of his simple sling. He has been working on gentle range of motion of the elbow, and feels stiffness with flexing the elbow. His elbow flexion is limited due to discomfort, he does not feel mechanical block at end range.   Patient has been using his extremity more regularly throughout the day, but is cautious with activities and not yet 100%.    He reports his average pain level throughout the day 2/10.  Treatments tried to this point: range of motion exercises, has discontinued use of sling and OTC medication.  Past Medical History: Unchanged from the visit of 8/14/20. Please refer to that note.  REVIEW OF SYSTEMS:  CONSTITUTIONAL:  NEGATIVE for fever, chills, change in weight  INTEGUMENTARY/SKIN:  NEGATIVE for worrisome rashes, moles or lesions  EYES:  NEGATIVE for vision changes or irritation  ENT/MOUTH:  NEGATIVE for ear, mouth and throat problems  RESP:  NEGATIVE for significant cough or SOB  BREAST:  NEGATIVE for masses, tenderness or discharge  CV:  NEGATIVE for chest pain, palpitations or peripheral edema  GI:  NEGATIVE for nausea, abdominal pain, heartburn, or change in bowel habits  :  Negative   MUSCULOSKELETAL:  See HPI above  NEURO:  NEGATIVE for weakness, dizziness or paresthesias  ENDOCRINE:  NEGATIVE for temperature intolerance, skin/hair changes  HEME/ALLERGY/IMMUNE:  NEGATIVE for bleeding problems  PSYCHIATRIC:  NEGATIVE for changes in mood or affect     PHYSICAL EXAM:  /74 (BP Location: Right arm, Patient Position: Chair, Cuff Size: Adult Regular)   Ht 1.803 m (5' 11\")   Wt 97.1 kg (214 lb)   BMI 29.85 kg/m    Body mass index is 29.85 kg/m .   GENERAL APPEARANCE: healthy, alert and no distress   SKIN: no suspicious lesions or rashes  NEURO: Normal strength and tone, " mentation intact and speech normal  VASCULAR:  good pulses, and cappillary refill   LYMPH: no lymphadenopathy   PSYCH:  mentation appears normal and affect normal/bright    MSK:  Not in acute distress  Normal gait     No deformities of the left upper extremity compared to right  Left elbow range of motion is lacking by 10 degrees in terms of flexion and 5 degrees from an terms of extension  Pronation and supination is quite well-maintained surprisingly  No pain at the radial head and neck region  Distal neurovascular status is intact  No swelling or erythema in the elbow region    IMAGING INTERPRETATION: Well-maintained slightly displaced radial head fracture, left elbow no further significant displacement from the previous x-rays      ASSESSMENT:  Left radial head fracture with slight limitation of range of motion    PLAN:  Continuation of stretching exercises was emphasized.  He should avoid strengthening until range of motion is improved near full.  However, even if he is stuck at the current level of range of motion, very likely he will have full functionality of the arm without significant limitation.  Once his range of motion is back to normal, he can resume all activities which I assume to be in 3 to 4 weeks  He can follow-up on as-needed basis.           Clark Vaughn MD  Dept. Orthopedic Surgery  [facility]       Disclaimer: This note consists of symbols derived from keyboarding, dictation and/or voice recognition software. As a result, there may be errors in the script that have gone undetected. Please consider this when interpreting information found in this chart.

## 2020-12-06 ENCOUNTER — HEALTH MAINTENANCE LETTER (OUTPATIENT)
Age: 40
End: 2020-12-06

## 2020-12-23 ENCOUNTER — OFFICE VISIT (OUTPATIENT)
Dept: FAMILY MEDICINE | Facility: CLINIC | Age: 40
End: 2020-12-23
Payer: COMMERCIAL

## 2020-12-23 VITALS
DIASTOLIC BLOOD PRESSURE: 80 MMHG | WEIGHT: 213 LBS | HEART RATE: 76 BPM | BODY MASS INDEX: 29.71 KG/M2 | TEMPERATURE: 97.5 F | OXYGEN SATURATION: 98 % | SYSTOLIC BLOOD PRESSURE: 110 MMHG

## 2020-12-23 DIAGNOSIS — R73.03 PREDIABETES: ICD-10-CM

## 2020-12-23 DIAGNOSIS — Z23 NEED FOR PROPHYLACTIC VACCINATION AND INOCULATION AGAINST INFLUENZA: ICD-10-CM

## 2020-12-23 DIAGNOSIS — R74.8 ELEVATED LIVER ENZYMES: ICD-10-CM

## 2020-12-23 DIAGNOSIS — E78.5 HYPERLIPIDEMIA LDL GOAL <130: ICD-10-CM

## 2020-12-23 DIAGNOSIS — Z00.00 ROUTINE GENERAL MEDICAL EXAMINATION AT A HEALTH CARE FACILITY: Primary | ICD-10-CM

## 2020-12-23 DIAGNOSIS — R79.89 ELEVATED SERUM CREATININE: ICD-10-CM

## 2020-12-23 DIAGNOSIS — Z82.49 FAMILY HISTORY OF EARLY CAD: ICD-10-CM

## 2020-12-23 LAB
ERYTHROCYTE [DISTWIDTH] IN BLOOD BY AUTOMATED COUNT: 13.8 % (ref 10–15)
HBA1C MFR BLD: 6.3 % (ref 0–5.6)
HCT VFR BLD AUTO: 50.5 % (ref 40–53)
HGB BLD-MCNC: 17.6 G/DL (ref 13.3–17.7)
MCH RBC QN AUTO: 30.3 PG (ref 26.5–33)
MCHC RBC AUTO-ENTMCNC: 34.9 G/DL (ref 31.5–36.5)
MCV RBC AUTO: 87 FL (ref 78–100)
PLATELET # BLD AUTO: 285 10E9/L (ref 150–450)
RBC # BLD AUTO: 5.81 10E12/L (ref 4.4–5.9)
WBC # BLD AUTO: 7.2 10E9/L (ref 4–11)

## 2020-12-23 PROCEDURE — 80061 LIPID PANEL: CPT | Performed by: FAMILY MEDICINE

## 2020-12-23 PROCEDURE — 80053 COMPREHEN METABOLIC PANEL: CPT | Performed by: FAMILY MEDICINE

## 2020-12-23 PROCEDURE — 36415 COLL VENOUS BLD VENIPUNCTURE: CPT | Performed by: FAMILY MEDICINE

## 2020-12-23 PROCEDURE — 99396 PREV VISIT EST AGE 40-64: CPT | Performed by: FAMILY MEDICINE

## 2020-12-23 PROCEDURE — 90471 IMMUNIZATION ADMIN: CPT | Performed by: FAMILY MEDICINE

## 2020-12-23 PROCEDURE — 84443 ASSAY THYROID STIM HORMONE: CPT | Performed by: FAMILY MEDICINE

## 2020-12-23 PROCEDURE — 83036 HEMOGLOBIN GLYCOSYLATED A1C: CPT | Performed by: FAMILY MEDICINE

## 2020-12-23 PROCEDURE — 90686 IIV4 VACC NO PRSV 0.5 ML IM: CPT | Performed by: FAMILY MEDICINE

## 2020-12-23 PROCEDURE — 82043 UR ALBUMIN QUANTITATIVE: CPT | Performed by: FAMILY MEDICINE

## 2020-12-23 PROCEDURE — 85027 COMPLETE CBC AUTOMATED: CPT | Performed by: FAMILY MEDICINE

## 2020-12-23 NOTE — PROGRESS NOTES
3  SUBJECTIVE:   CC: Kwasi Gonzalez is an 40 year old male who presents for preventive health visit.       Patient has been advised of split billing requirements and indicates understanding: Yes  Healthy Habits:    Do you get at least three servings of calcium containing foods daily (dairy, green leafy vegetables, etc.)? yes    Amount of exercise or daily activities, outside of work:  3 days a week     Problems taking medications regularly No    Medication side effects: No    Have you had an eye exam in the past two years? yes    Do you see a dentist twice per year? yes    Do you have sleep apnea, excessive snoring or daytime drowsiness?yes        Today's PHQ-2 Score:   PHQ-2 (  [employer]) 2019   Q1: Little interest or pleasure in doing things 0 0   Q2: Feeling down, depressed or hopeless 0 0   PHQ-2 Score 0 0       Abuse: Current or Past(Physical, Sexual or Emotional)- No  Do you feel safe in your environment? Yes        Social History     Tobacco Use     Smoking status: Former Smoker     Types: Cigarettes     Quit date: 2015     Years since quittin.5     Smokeless tobacco: Never Used   Substance Use Topics     Alcohol use: No     Alcohol/week: 0.0 standard drinks     If you drink alcohol do you typically have >3 drinks per day or >7 drinks per week? No                      Last PSA: No results found for: PSA    Reviewed orders with patient. Reviewed health maintenance and updated orders accordingly - Yes  BP Readings from Last 3 Encounters:   20 110/80   20 112/74   20 124/84    Wt Readings from Last 3 Encounters:   20 96.6 kg (213 lb)   20 97.1 kg (214 lb)   20 97.1 kg (214 lb)                  Patient Active Problem List   Diagnosis     Elevated serum creatinine     Elevated liver enzymes     Family history of early CAD     Family history of diabetes mellitus     Obesity     Hyperlipidemia LDL goal <130     Vitamin D deficiency     Prediabetes     Past  Surgical History:   Procedure Laterality Date     NO HISTORY OF SURGERY         Social History     Tobacco Use     Smoking status: Former Smoker     Types: Cigarettes     Quit date: 2015     Years since quittin.5     Smokeless tobacco: Never Used   Substance Use Topics     Alcohol use: No     Alcohol/week: 0.0 standard drinks     Family History   Problem Relation Age of Onset     Diabetes Mother      Diabetes Father      Heart Disease Father         CAD   of MI age 58     C.A.D. Mother         CAD age 64         Current Outpatient Medications   Medication Sig Dispense Refill     Multiple Vitamins-Minerals (MULTIVITAMIN ADULT PO) Take by mouth daily       Allergies   Allergen Reactions     No Known Drug Allergies      Recent Labs   Lab Test 19  0813 17  1028 17  0936 10/20/16  0900 16  0854   A1C 6.3*  --  5.8 5.8 5.8   * 100* 113* 114* 97   HDL 44 55 48 43 36*   TRIG 187* 121 174* 143 139   ALT 66 44  --   --  52   CR 1.29* 1.31* 1.43* 1.31* 1.29*   GFRESTIMATED 69 61 55* 61 63   GFRESTBLACK 80 74 67 74 76   POTASSIUM 4.3 4.3 4.4 4.5 4.3   TSH 11.28* 5.81*  --   --   --         Reviewed and updated as needed this visit by clinical staff  Tobacco  Allergies  Meds              Reviewed and updated as needed this visit by Provider                Past Medical History:   Diagnosis Date     Hyperlipidemia 2009     Hyperlipidemia LDL goal <130 2014     NO ACTIVE PROBLEMS      Vitamin D deficiency 8/3/2015      Past Surgical History:   Procedure Laterality Date     NO HISTORY OF SURGERY         ROS:  CONSTITUTIONAL: NEGATIVE for fever, chills, change in weight  INTEGUMENTARY/SKIN: NEGATIVE for worrisome rashes, moles or lesions  EYES: NEGATIVE for vision changes or irritation  ENT: NEGATIVE for ear, mouth and throat problems  RESP: NEGATIVE for significant cough or SOB  CV: NEGATIVE for chest pain, palpitations or peripheral edema  GI: NEGATIVE for nausea, abdominal  pain, heartburn, or change in bowel habits   male: negative for dysuria, hematuria, decreased urinary stream, erectile dysfunction, urethral discharge  MUSCULOSKELETAL: NEGATIVE for significant arthralgias or myalgia  NEURO: NEGATIVE for weakness, dizziness or paresthesias  PSYCHIATRIC: NEGATIVE for changes in mood or affect    OBJECTIVE:   /80   Pulse 76   Temp 97.5  F (36.4  C) (Tympanic)   Wt 96.6 kg (213 lb)   SpO2 98%   BMI 29.71 kg/m    EXAM:  GENERAL: healthy, alert and no distress  EYES: Eyes grossly normal to inspection, PERRL and conjunctivae and sclerae normal  HENT: ear canals and TM's normal, nose and mouth without ulcers or lesions  NECK: no adenopathy, no asymmetry, masses, or scars and thyroid normal to palpation  RESP: lungs clear to auscultation - no rales, rhonchi or wheezes  CV: regular rate and rhythm, normal S1 S2, no S3 or S4, no murmur, click or rub, no peripheral edema and peripheral pulses strong  ABDOMEN: soft, nontender, no hepatosplenomegaly, no masses and bowel sounds normal  MS: no gross musculoskeletal defects noted, no edema  SKIN: no suspicious lesions or rashes  NEURO: Normal strength and tone, mentation intact and speech normal  BACK: no CVA tenderness, no paralumbar tenderness  PSYCH: mentation appears normal, affect normal/bright  LYMPH: no cervical, supraclavicular, axillary, or inguinal adenopathy    Diagnostic Test Results:  Labs reviewed in Epic    ASSESSMENT/PLAN:       ICD-10-CM    1. Routine general medical examination at a health care facility  Z00.00 INFLUENZA VACCINE IM > 6 MONTHS VALENT IIV4 [95300]     CBC with platelets     Comprehensive metabolic panel (BMP + Alb, Alk Phos, ALT, AST, Total. Bili, TP)     Lipid panel reflex to direct LDL Fasting     TSH with free T4 reflex     **A1C FUTURE anytime     Albumin Random Urine Quantitative with Creat Ratio   2. Elevated serum creatinine  R79.89 CBC with platelets     Comprehensive metabolic panel (BMP + Alb,  "Alk Phos, ALT, AST, Total. Bili, TP)     Lipid panel reflex to direct LDL Fasting     TSH with free T4 reflex     **A1C FUTURE anytime     Albumin Random Urine Quantitative with Creat Ratio   3. Elevated liver enzymes  R74.8 CBC with platelets     Comprehensive metabolic panel (BMP + Alb, Alk Phos, ALT, AST, Total. Bili, TP)     Lipid panel reflex to direct LDL Fasting     TSH with free T4 reflex     **A1C FUTURE anytime     Albumin Random Urine Quantitative with Creat Ratio   4. Family history of early CAD  Z82.49 CBC with platelets     Comprehensive metabolic panel (BMP + Alb, Alk Phos, ALT, AST, Total. Bili, TP)     Lipid panel reflex to direct LDL Fasting     TSH with free T4 reflex     **A1C FUTURE anytime     Albumin Random Urine Quantitative with Creat Ratio   5. Hyperlipidemia LDL goal <130  E78.5 CBC with platelets     Comprehensive metabolic panel (BMP + Alb, Alk Phos, ALT, AST, Total. Bili, TP)     Lipid panel reflex to direct LDL Fasting     TSH with free T4 reflex     **A1C FUTURE anytime     Albumin Random Urine Quantitative with Creat Ratio   6. Prediabetes  R73.03 CBC with platelets     Comprehensive metabolic panel (BMP + Alb, Alk Phos, ALT, AST, Total. Bili, TP)     Lipid panel reflex to direct LDL Fasting     TSH with free T4 reflex     **A1C FUTURE anytime     Albumin Random Urine Quantitative with Creat Ratio       Patient has been advised of split billing requirements and indicates understanding: Yes  COUNSELING:  Reviewed preventive health counseling, as reflected in patient instructions    Estimated body mass index is 29.71 kg/m  as calculated from the following:    Height as of 9/18/20: 1.803 m (5' 11\").    Weight as of this encounter: 96.6 kg (213 lb).        He reports that he quit smoking about 5 years ago. His smoking use included cigarettes. He has never used smokeless tobacco.      Counseling Resources:  ATP IV Guidelines  Pooled Cohorts Equation Calculator  FRAX Risk Assessment  ICSI " Preventive Guidelines  Dietary Guidelines for Americans, 2010  USDA's MyPlate  ASA Prophylaxis  Lung CA Screening    Samuel Lnadrum MD  [facility]

## 2020-12-24 LAB
ALBUMIN SERPL-MCNC: 4.1 G/DL (ref 3.4–5)
ALP SERPL-CCNC: 77 U/L (ref 40–150)
ALT SERPL W P-5'-P-CCNC: 50 U/L (ref 0–70)
ANION GAP SERPL CALCULATED.3IONS-SCNC: 6 MMOL/L (ref 3–14)
AST SERPL W P-5'-P-CCNC: 27 U/L (ref 0–45)
BILIRUB SERPL-MCNC: 1 MG/DL (ref 0.2–1.3)
BUN SERPL-MCNC: 11 MG/DL (ref 7–30)
CALCIUM SERPL-MCNC: 9.8 MG/DL (ref 8.5–10.1)
CHLORIDE SERPL-SCNC: 107 MMOL/L (ref 94–109)
CHOLEST SERPL-MCNC: 190 MG/DL
CO2 SERPL-SCNC: 25 MMOL/L (ref 20–32)
CREAT SERPL-MCNC: 1.3 MG/DL (ref 0.66–1.25)
GFR SERPL CREATININE-BSD FRML MDRD: 68 ML/MIN/{1.73_M2}
GLUCOSE SERPL-MCNC: 104 MG/DL (ref 70–99)
HDLC SERPL-MCNC: 46 MG/DL
LDLC SERPL CALC-MCNC: 102 MG/DL
NONHDLC SERPL-MCNC: 144 MG/DL
POTASSIUM SERPL-SCNC: 4.6 MMOL/L (ref 3.4–5.3)
PROT SERPL-MCNC: 7.6 G/DL (ref 6.8–8.8)
SODIUM SERPL-SCNC: 138 MMOL/L (ref 133–144)
TRIGL SERPL-MCNC: 209 MG/DL
TSH SERPL DL<=0.005 MIU/L-ACNC: 3.48 MU/L (ref 0.4–4)

## 2020-12-25 LAB
CREAT UR-MCNC: 171 MG/DL
MICROALBUMIN UR-MCNC: <5 MG/L
MICROALBUMIN/CREAT UR: NORMAL MG/G CR (ref 0–17)

## 2021-01-08 ENCOUNTER — TELEPHONE (OUTPATIENT)
Dept: ORTHOPEDICS | Facility: CLINIC | Age: 41
End: 2021-01-08

## 2021-01-08 DIAGNOSIS — M25.522 LEFT ELBOW PAIN: ICD-10-CM

## 2021-01-08 DIAGNOSIS — S52.122D CLOSED DISPLACED FRACTURE OF HEAD OF LEFT RADIUS WITH ROUTINE HEALING, SUBSEQUENT ENCOUNTER: Primary | ICD-10-CM

## 2021-01-08 NOTE — TELEPHONE ENCOUNTER
Please advise if you are ok with PT orders for patient. Last OV was 9/18/20.    PT order pending    Bibi Titus ATC

## 2021-01-08 NOTE — TELEPHONE ENCOUNTER
[facility] Call Center    Phone Message    May a detailed message be left on voicemail: yes     Reason for Call: Order(s): Other:   Reason for requested: PT order  Date needed: 1/8/2021  Provider name: Efra Vaughn MD      Action Taken: Message routed to:  Other: [facility]    Travel Screening: Not Applicable

## 2021-01-11 ENCOUNTER — THERAPY VISIT (OUTPATIENT)
Dept: PHYSICAL THERAPY | Facility: CLINIC | Age: 41
End: 2021-01-11
Payer: COMMERCIAL

## 2021-01-11 DIAGNOSIS — M25.622 STIFFNESS OF ELBOW JOINT, LEFT: ICD-10-CM

## 2021-01-11 PROCEDURE — 97161 PT EVAL LOW COMPLEX 20 MIN: CPT | Mod: GP | Performed by: PHYSICAL THERAPIST

## 2021-01-11 PROCEDURE — 97110 THERAPEUTIC EXERCISES: CPT | Mod: GP | Performed by: PHYSICAL THERAPIST

## 2021-01-11 NOTE — PROGRESS NOTES
[facility] Initial Evaluation  Subjective:  The history is provided by the patient. No  was used.   Patient Health History  Kwasi Gonzalez being seen for left elbow pain.     Problem began: 8/1/2020.   Problem occurred: fell on my hand while playing   Pain is reported as 6/10 on pain scale.  General health as reported by patient is excellent.  Pertinent medical history includes: none.   Red flags:  None as reported by patient.  Medical allergies: none.   Surgeries include:  None.    Current medications:  None.    Current occupation is .   Primary job tasks include:  Computer work.                  Therapist Generated HPI Evaluation         Type of problem:  Left elbow.    This is a new condition.  Condition occurred with:  A fall.  Where condition occurred: at home.  Patient reports pain:  In the joint.  Pain is described as sharp and is intermittent.      Associated symptoms:  Loss of motion/stiffness. Exacerbated by: during certain movements - especially with bending the elbow.  and relieved by nothing.      Restrictions due to condition include:  Working in normal job without restrictions.  Barriers include:  None as reported by patient.                        Objective:  System                        ROM:  AROM:    Hyperextension Elbow: Right:  5  Flexion Elbow:  Left:98   Right:135  Extension Elbow:  Left: 16    Right: 0      Supination Elbow/Wrist:  Left: 90Right: 75  Pronation Elbow/Wrist:  Left: 60    Right: 86      PROM:      Flexion Elbow:  Left: 0Right: 106 (ERP)  Extension Elbow: Left: 2      Supination Elbow/Wrist: Left: 90        Pronation Elbow/Wrist: Left: 70              Strength:    Flexion Elbow:  Left: 5/5 Pain:    Right: 5/5 Pain:  Extension Elbow: Left: 4/5  Pain:+    Right: 5/5 Pain:    Extension Wrist: Left: 5/5 Pain:  Right: 5/5 Pain:  Supination Elbow/Wrist: Left: 4/5 Pain:    Right: 5/5 Pain:  Pronation Elbow/Wrist: Left: 5/5 Pain:         Right: 5/5 Pain:  Radial Deviation:  Left: 4-/5 Pain:    Right: 5/5 Pain:  Ulnar Deviation: Left: 4-/5 Pain:    Right: 5/5 Pain:      Palpation:  Palpation elbow/wrist: mild swelling around L elbow joint.                                    General     ROS    Assessment/Plan:    Patient is a 40 year old male with left side elbow complaints.    Patient has the following significant findings with corresponding treatment plan.                Diagnosis 1:  L elbow dysfunction s/p fx  Pain -  hot/cold therapy  Decreased ROM/flexibility - manual therapy, therapeutic exercise and home program  Edema - cold therapy  Impaired muscle performance - neuro re-education and home program  Decreased function - therapeutic activities and home program    Therapy Evaluation Codes:   Cumulative Therapy Evaluation is: Low complexity.    Previous and current functional limitations:  (See Goal Flow Sheet for this information)    Short term and Long term goals: (See Goal Flow Sheet for this information)     Communication ability:  Patient appears to be able to clearly communicate and understand verbal and written communication and follow directions correctly.  Treatment Explanation - The following has been discussed with the patient:   RX ordered/plan of care  Anticipated outcomes  Possible risks and side effects  This patient would benefit from PT intervention to resume normal activities.   Rehab potential is good.    Frequency:  1 X week, once daily  Duration:  for 6-8 weeks  Discharge Plan:  Achieve all LTG.  Independent in home treatment program.  Reach maximal therapeutic benefit.    Please refer to the daily flowsheet for treatment today, total treatment time and time spent performing 1:1 timed codes.

## 2021-01-21 ENCOUNTER — THERAPY VISIT (OUTPATIENT)
Dept: PHYSICAL THERAPY | Facility: CLINIC | Age: 41
End: 2021-01-21
Payer: COMMERCIAL

## 2021-01-21 DIAGNOSIS — M25.622 STIFFNESS OF ELBOW JOINT, LEFT: ICD-10-CM

## 2021-01-21 PROCEDURE — 97110 THERAPEUTIC EXERCISES: CPT | Mod: GP | Performed by: PHYSICAL THERAPIST

## 2021-01-21 PROCEDURE — 97140 MANUAL THERAPY 1/> REGIONS: CPT | Mod: GP | Performed by: PHYSICAL THERAPIST

## 2021-01-25 ENCOUNTER — THERAPY VISIT (OUTPATIENT)
Dept: PHYSICAL THERAPY | Facility: CLINIC | Age: 41
End: 2021-01-25
Payer: COMMERCIAL

## 2021-01-25 DIAGNOSIS — M25.622 STIFFNESS OF ELBOW JOINT, LEFT: ICD-10-CM

## 2021-01-25 PROCEDURE — 97110 THERAPEUTIC EXERCISES: CPT | Mod: GP | Performed by: PHYSICAL THERAPIST

## 2021-01-25 PROCEDURE — 97140 MANUAL THERAPY 1/> REGIONS: CPT | Mod: GP | Performed by: PHYSICAL THERAPIST

## 2021-03-15 NOTE — PROGRESS NOTES
Subjective:  HPI  Physical Exam                    Objective:  System    Physical Exam    General     ROS    Assessment/Plan:    DISCHARGE REPORT    Progress reporting period is from 1/11/2021 to 1/25/2021.       SUBJECTIVE  Subjective changes noted by patient:  As of last PT visit on 1/25/20201 he had been seen in PT for 3 visits.  He reported that he has been doing his stretches 4-5 times/day.  He is discouraged that he hasn't felt like he's made much improvement in his motion.  He has not returned for any additional PT since then.    Changes in function:  None  Adverse reaction to treatment or activity: None    OBJECTIVE  Changes noted in objective findings:  The objective findings below are from DOS 1/25/2021.  Objective: L shoulder AROM flex 104, ext 18.  PROM L elbow flex 126, ext (supinated) 10, ext neutral 6.     ASSESSMENT/PLAN  Assessment of Progress: The patient's condition has potential to improve.  Self Management Plans:  Patient has been instructed in a home treatment program.    Recommendations:  Pt will be discharged from PT.

## 2021-05-04 PROBLEM — M25.622 STIFFNESS OF ELBOW JOINT, LEFT: Status: RESOLVED | Noted: 2021-01-11 | Resolved: 2021-05-04

## 2021-06-30 ENCOUNTER — OFFICE VISIT (OUTPATIENT)
Dept: FAMILY MEDICINE | Facility: CLINIC | Age: 41
End: 2021-06-30
Payer: COMMERCIAL

## 2021-06-30 VITALS
SYSTOLIC BLOOD PRESSURE: 112 MMHG | WEIGHT: 209 LBS | HEART RATE: 84 BPM | BODY MASS INDEX: 29.92 KG/M2 | HEIGHT: 70 IN | DIASTOLIC BLOOD PRESSURE: 80 MMHG | OXYGEN SATURATION: 97 % | TEMPERATURE: 97.6 F

## 2021-06-30 DIAGNOSIS — Z23 NEED FOR VACCINATION: Primary | ICD-10-CM

## 2021-06-30 PROCEDURE — 90471 IMMUNIZATION ADMIN: CPT | Performed by: FAMILY MEDICINE

## 2021-06-30 PROCEDURE — 90715 TDAP VACCINE 7 YRS/> IM: CPT | Performed by: FAMILY MEDICINE

## 2021-06-30 PROCEDURE — 90716 VAR VACCINE LIVE SUBQ: CPT | Performed by: FAMILY MEDICINE

## 2021-06-30 PROCEDURE — 90707 MMR VACCINE SC: CPT | Performed by: FAMILY MEDICINE

## 2021-06-30 PROCEDURE — 99212 OFFICE O/P EST SF 10 MIN: CPT | Mod: 25 | Performed by: FAMILY MEDICINE

## 2021-06-30 PROCEDURE — 90472 IMMUNIZATION ADMIN EACH ADD: CPT | Performed by: FAMILY MEDICINE

## 2021-06-30 ASSESSMENT — MIFFLIN-ST. JEOR: SCORE: 1859.27

## 2021-06-30 NOTE — LETTER
[facility]  [address] [address]  Phone: [phone]    June 30, 2021        Kwasi Gonzalez  [address] [address]          To whom it may concern:    RE: Kwasi Gonzalez    Patient has been under my medical care, and he had MMR, Varicella, and TDAP today for purpose of process in immigration documentation. The records is attached with this letter.  He may not need additional MMR. However, we will plan to have 2nd Varicella vaccine in 4 weeks or after if it is needed for immigration process.     Please contact me for questions or concerns.      Sincerely,        Samuel Landrum MD

## 2021-06-30 NOTE — PROGRESS NOTES
"    Assessment & Plan     Need for vaccination  Pt need TDAP/MMR and Varicella vaccination for current immigration process,   He cannot find his proof of these vaccination, and he chose getting shots rather than titer lab, will have him to get the shots today, and will consider 2nd booster Varicella 4 weeks of later if the immigration process requires him to have that. The letter of excuse is written.  - TDAP VACCINE (Adacel, Boostrix)  [[id number]]  - MMR VIRUS IMMUNIZATION [[id number]]  - VARICELLA  (chicken pox) VACCINE [[id number]]           BMI:   Estimated body mass index is 29.99 kg/m  as calculated from the following:    Height as of this encounter: 1.778 m (5' 10\").    Weight as of this encounter: 94.8 kg (209 lb).   Weight management plan: Discussed healthy diet and exercise guidelines    FUTURE APPOINTMENTS:       - Follow-up visit in 4 weeks of later if required to be vaccinated on 2nd booster of Varicella     No follow-ups on file.    Samuel Landrum MD  [facility] MEGHNA PRAIRIOVI Villalpando is a 40 year old who presents for the following health issues     HPI     Concern -  immunizations   Onset:   Description: pt needs order for MMR and varicella titer   Intensity:  Progression of Symptoms:    Accompanying Signs & Symptoms: pt also needs t dap   Previous history of similar problem:   Precipitating factors:        Worsened by:   Alleviating factors:        Improved by:   Therapies tried and outcome:         Review of Systems   Constitutional, HEENT, cardiovascular, pulmonary, gi and gu systems are negative, except as otherwise noted.      Objective    /80   Pulse 84   Temp 97.6  F (36.4  C) (Tympanic)   Ht 1.778 m (5' 10\")   Wt 94.8 kg (209 lb)   SpO2 97%   BMI 29.99 kg/m    Body mass index is 29.99 kg/m .  Physical Exam   GENERAL: healthy, alert and no distress  EYES: Eyes grossly normal to inspection, PERRL and conjunctivae and sclerae normal  HENT: ear canals and TM's normal, " nose and mouth without ulcers or lesions  NECK: no adenopathy, no asymmetry, masses, or scars and thyroid normal to palpation  RESP: lungs clear to auscultation - no rales, rhonchi or wheezes  CV: regular rate and rhythm, normal S1 S2, no S3 or S4, no murmur, click or rub, no peripheral edema and peripheral pulses strong  ABDOMEN: soft, nontender, no hepatosplenomegaly, no masses and bowel sounds normal  MS: no gross musculoskeletal defects noted, no edema

## 2021-09-26 ENCOUNTER — HEALTH MAINTENANCE LETTER (OUTPATIENT)
Age: 41
End: 2021-09-26

## 2022-03-12 ENCOUNTER — HEALTH MAINTENANCE LETTER (OUTPATIENT)
Age: 42
End: 2022-03-12

## 2022-09-18 ASSESSMENT — ENCOUNTER SYMPTOMS
DIZZINESS: 0
EYE PAIN: 0
WEAKNESS: 0
PARESTHESIAS: 0
HEMATURIA: 0
DYSURIA: 0
HEMATOCHEZIA: 0
CONSTIPATION: 0
HEADACHES: 0
MYALGIAS: 0
NERVOUS/ANXIOUS: 0
SHORTNESS OF BREATH: 0
FREQUENCY: 0
HEARTBURN: 0
NAUSEA: 0
PALPITATIONS: 0
FEVER: 0
ABDOMINAL PAIN: 0
JOINT SWELLING: 0
COUGH: 0
CHILLS: 0
ARTHRALGIAS: 0
SORE THROAT: 0
DIARRHEA: 0

## 2022-09-22 ENCOUNTER — OFFICE VISIT (OUTPATIENT)
Dept: FAMILY MEDICINE | Facility: CLINIC | Age: 42
End: 2022-09-22
Payer: COMMERCIAL

## 2022-09-22 VITALS
HEART RATE: 76 BPM | SYSTOLIC BLOOD PRESSURE: 114 MMHG | DIASTOLIC BLOOD PRESSURE: 82 MMHG | HEIGHT: 71 IN | RESPIRATION RATE: 16 BRPM | BODY MASS INDEX: 29.12 KG/M2 | TEMPERATURE: 97.7 F | WEIGHT: 208 LBS | OXYGEN SATURATION: 97 %

## 2022-09-22 DIAGNOSIS — L20.84 INTRINSIC ECZEMA: ICD-10-CM

## 2022-09-22 DIAGNOSIS — R79.89 ELEVATED SERUM CREATININE: ICD-10-CM

## 2022-09-22 DIAGNOSIS — R79.89 ELEVATED TSH: ICD-10-CM

## 2022-09-22 DIAGNOSIS — E78.5 HYPERLIPIDEMIA LDL GOAL <130: ICD-10-CM

## 2022-09-22 DIAGNOSIS — Z00.00 HEALTH MAINTENANCE EXAMINATION: Primary | ICD-10-CM

## 2022-09-22 DIAGNOSIS — E55.9 VITAMIN D DEFICIENCY: ICD-10-CM

## 2022-09-22 DIAGNOSIS — R73.03 PREDIABETES: ICD-10-CM

## 2022-09-22 LAB
ALBUMIN SERPL-MCNC: 4.1 G/DL (ref 3.4–5)
ALP SERPL-CCNC: 85 U/L (ref 40–150)
ALT SERPL W P-5'-P-CCNC: 74 U/L (ref 0–70)
ANION GAP SERPL CALCULATED.3IONS-SCNC: 4 MMOL/L (ref 3–14)
AST SERPL W P-5'-P-CCNC: 34 U/L (ref 0–45)
BILIRUB SERPL-MCNC: 1.1 MG/DL (ref 0.2–1.3)
BUN SERPL-MCNC: 16 MG/DL (ref 7–30)
CALCIUM SERPL-MCNC: 9.8 MG/DL (ref 8.5–10.1)
CHLORIDE BLD-SCNC: 104 MMOL/L (ref 94–109)
CHOLEST SERPL-MCNC: 225 MG/DL
CO2 SERPL-SCNC: 29 MMOL/L (ref 20–32)
CREAT SERPL-MCNC: 1.31 MG/DL (ref 0.66–1.25)
CREAT UR-MCNC: 154 MG/DL
DEPRECATED CALCIDIOL+CALCIFEROL SERPL-MC: 25 UG/L (ref 20–75)
ERYTHROCYTE [DISTWIDTH] IN BLOOD BY AUTOMATED COUNT: 13.9 % (ref 10–15)
FASTING STATUS PATIENT QL REPORTED: YES
GFR SERPL CREATININE-BSD FRML MDRD: 69 ML/MIN/1.73M2
GLUCOSE BLD-MCNC: 109 MG/DL (ref 70–99)
HBA1C MFR BLD: 6.1 % (ref 0–5.6)
HCT VFR BLD AUTO: 52.9 % (ref 40–53)
HDLC SERPL-MCNC: 47 MG/DL
HGB BLD-MCNC: 18.2 G/DL (ref 13.3–17.7)
LDLC SERPL CALC-MCNC: 140 MG/DL
MCH RBC QN AUTO: 30.6 PG (ref 26.5–33)
MCHC RBC AUTO-ENTMCNC: 34.4 G/DL (ref 31.5–36.5)
MCV RBC AUTO: 89 FL (ref 78–100)
MICROALBUMIN UR-MCNC: <5 MG/L
MICROALBUMIN/CREAT UR: NORMAL MG/G{CREAT}
NONHDLC SERPL-MCNC: 178 MG/DL
PLATELET # BLD AUTO: 262 10E3/UL (ref 150–450)
POTASSIUM BLD-SCNC: 4.7 MMOL/L (ref 3.4–5.3)
PROT SERPL-MCNC: 7.9 G/DL (ref 6.8–8.8)
RBC # BLD AUTO: 5.95 10E6/UL (ref 4.4–5.9)
SODIUM SERPL-SCNC: 137 MMOL/L (ref 133–144)
T4 FREE SERPL-MCNC: 0.93 NG/DL (ref 0.76–1.46)
TRIGL SERPL-MCNC: 192 MG/DL
TSH SERPL DL<=0.005 MIU/L-ACNC: 5.7 MU/L (ref 0.4–4)
WBC # BLD AUTO: 6.9 10E3/UL (ref 4–11)

## 2022-09-22 PROCEDURE — 36415 COLL VENOUS BLD VENIPUNCTURE: CPT | Performed by: FAMILY MEDICINE

## 2022-09-22 PROCEDURE — 80061 LIPID PANEL: CPT | Performed by: FAMILY MEDICINE

## 2022-09-22 PROCEDURE — 82306 VITAMIN D 25 HYDROXY: CPT | Performed by: FAMILY MEDICINE

## 2022-09-22 PROCEDURE — 83036 HEMOGLOBIN GLYCOSYLATED A1C: CPT | Performed by: FAMILY MEDICINE

## 2022-09-22 PROCEDURE — 85027 COMPLETE CBC AUTOMATED: CPT | Performed by: FAMILY MEDICINE

## 2022-09-22 PROCEDURE — 84443 ASSAY THYROID STIM HORMONE: CPT | Performed by: FAMILY MEDICINE

## 2022-09-22 PROCEDURE — 80053 COMPREHEN METABOLIC PANEL: CPT | Performed by: FAMILY MEDICINE

## 2022-09-22 PROCEDURE — 99396 PREV VISIT EST AGE 40-64: CPT | Performed by: FAMILY MEDICINE

## 2022-09-22 PROCEDURE — 82043 UR ALBUMIN QUANTITATIVE: CPT | Performed by: FAMILY MEDICINE

## 2022-09-22 PROCEDURE — 84439 ASSAY OF FREE THYROXINE: CPT | Performed by: FAMILY MEDICINE

## 2022-09-22 RX ORDER — TRIAMCINOLONE ACETONIDE 1 MG/G
CREAM TOPICAL AT BEDTIME
Qty: 45 G | Refills: 3 | Status: SHIPPED | OUTPATIENT
Start: 2022-09-22 | End: 2024-02-06

## 2022-09-22 ASSESSMENT — ENCOUNTER SYMPTOMS
DIARRHEA: 0
DYSURIA: 0
FEVER: 0
PALPITATIONS: 0
DIZZINESS: 0
HEADACHES: 0
HEARTBURN: 0
HEMATURIA: 0
ABDOMINAL PAIN: 0
CHILLS: 0
NERVOUS/ANXIOUS: 0
PARESTHESIAS: 0
SORE THROAT: 0
FREQUENCY: 0
HEMATOCHEZIA: 0
JOINT SWELLING: 0
MYALGIAS: 0
SHORTNESS OF BREATH: 0
WEAKNESS: 0
COUGH: 0
NAUSEA: 0
CONSTIPATION: 0
ARTHRALGIAS: 0
EYE PAIN: 0

## 2022-09-22 ASSESSMENT — PAIN SCALES - GENERAL: PAINLEVEL: NO PAIN (0)

## 2022-09-22 NOTE — PROGRESS NOTES
SUBJECTIVE:   CC: Kwasi is an 42 year old who presents for preventative health visit.     Patient has been advised of split billing requirements and indicates understanding: Yes  Healthy Habits:     Getting at least 3 servings of Calcium per day:  Yes    Bi-annual eye exam:  Yes    Dental care twice a year:  Yes    Sleep apnea or symptoms of sleep apnea:  Excessive snoring    Diet:  Regular (no restrictions)    Frequency of exercise:  4-5 days/week    Duration of exercise:  30-45 minutes    Taking medications regularly:  Yes    Medication side effects:  None    PHQ-2 Total Score: 0    Additional concerns today:  No      Today's PHQ-2 Score:   PHQ-2 (  [employer]) 2022   Q1: Little interest or pleasure in doing things 0   Q2: Feeling down, depressed or hopeless 0   PHQ-2 Score 0   PHQ-2 Total Score (12-17 Years)- Positive if 3 or more points; Administer PHQ-A if positive -   Q1: Little interest or pleasure in doing things Not at all   Q2: Feeling down, depressed or hopeless Not at all   PHQ-2 Score 0       Abuse: Current or Past(Physical, Sexual or Emotional)- No  Do you feel safe in your environment? Yes    Have you ever done Advance Care Planning? (For example, a Health Directive, POLST, or a discussion with a medical provider or your loved ones about your wishes): No, advance care planning information given to patient to review.  Patient plans to discuss their wishes with loved ones or provider.      Social History     Tobacco Use     Smoking status: Former Smoker     Types: Cigarettes     Quit date: 2015     Years since quittin.3     Smokeless tobacco: Never Used   Substance Use Topics     Alcohol use: No     Alcohol/week: 0.0 standard drinks     If you drink alcohol do you typically have >3 drinks per day or >7 drinks per week? No    Alcohol Use 2022   Prescreen: >3 drinks/day or >7 drinks/week? No   Prescreen: >3 drinks/day or >7 drinks/week? -   No flowsheet data found.    Last PSA: No  results found for: PSA    Reviewed orders with patient. Reviewed health maintenance and updated orders accordingly - Yes  BP Readings from Last 3 Encounters:   22 114/82   21 112/80   20 110/80    Wt Readings from Last 3 Encounters:   22 94.3 kg (208 lb)   21 94.8 kg (209 lb)   20 96.6 kg (213 lb)                  Patient Active Problem List   Diagnosis     Elevated serum creatinine     Elevated liver enzymes     Family history of early CAD     Family history of diabetes mellitus     Obesity     Hyperlipidemia LDL goal <130     Vitamin D deficiency     Prediabetes     Past Surgical History:   Procedure Laterality Date     NO HISTORY OF SURGERY         Social History     Tobacco Use     Smoking status: Former Smoker     Types: Cigarettes     Quit date: 2015     Years since quittin.3     Smokeless tobacco: Never Used   Substance Use Topics     Alcohol use: No     Alcohol/week: 0.0 standard drinks     Family History   Problem Relation Age of Onset     Diabetes Mother      Diabetes Father      Heart Disease Father         CAD   of MI age 58     C.A.D. Mother         CAD age 64         Current Outpatient Medications   Medication Sig Dispense Refill     Multiple Vitamins-Minerals (MULTIVITAMIN ADULT PO) Take by mouth daily       triamcinolone (KENALOG) 0.1 % external cream Apply topically At Bedtime 45 g 3     Allergies   Allergen Reactions     No Known Drug Allergies      Recent Labs   Lab Test 20  1048 19  0813 17  1028 17  0936 17  0936   A1C 6.3* 6.3*  --   --  5.8   * 125* 100*  --  113*   HDL 46 44 55  --  48   TRIG 209* 187* 121  --  174*   ALT 50 66 44  --   --    CR 1.30* 1.29* 1.31*  --  1.43*   GFRESTIMATED 68 69 61  --  55*   GFRESTBLACK 78 80 74  --  67   POTASSIUM 4.6 4.3 4.3  --  4.4   TSH 3.48 11.28* 5.81*   < >  --     < > = values in this interval not displayed.        Reviewed and updated as needed this visit by  "clinical staff                    Reviewed and updated as needed this visit by Provider                   Past Medical History:   Diagnosis Date     Hyperlipidemia 1/30/2009     Hyperlipidemia LDL goal <130 11/11/2014     NO ACTIVE PROBLEMS      Vitamin D deficiency 8/3/2015      Past Surgical History:   Procedure Laterality Date     NO HISTORY OF SURGERY         Review of Systems   Constitutional: Negative for chills and fever.   HENT: Negative for congestion, ear pain, hearing loss and sore throat.    Eyes: Negative for pain and visual disturbance.   Respiratory: Negative for cough and shortness of breath.    Cardiovascular: Negative for chest pain, palpitations and peripheral edema.   Gastrointestinal: Negative for abdominal pain, constipation, diarrhea, heartburn, hematochezia and nausea.   Genitourinary: Negative for dysuria, frequency, genital sores, hematuria, impotence, penile discharge and urgency.   Musculoskeletal: Negative for arthralgias, joint swelling and myalgias.   Skin: Negative for rash.   Neurological: Negative for dizziness, weakness, headaches and paresthesias.   Psychiatric/Behavioral: Negative for mood changes. The patient is not nervous/anxious.          OBJECTIVE:   /82   Pulse 76   Temp 97.7  F (36.5  C) (Temporal)   Resp 16   Ht 1.803 m (5' 11\")   Wt 94.3 kg (208 lb)   SpO2 97%   BMI 29.01 kg/m      Physical Exam  GENERAL: healthy, alert and no distress  EYES: Eyes grossly normal to inspection, PERRL and conjunctivae and sclerae normal  HENT: ear canals and TM's normal, nose and mouth without ulcers or lesions  NECK: no adenopathy, no asymmetry, masses, or scars and thyroid normal to palpation  RESP: lungs clear to auscultation - no rales, rhonchi or wheezes  CV: regular rate and rhythm, normal S1 S2, no S3 or S4, no murmur, click or rub, no peripheral edema and peripheral pulses strong  ABDOMEN: soft, nontender, no hepatosplenomegaly, no masses and bowel sounds normal  MS: " "no gross musculoskeletal defects noted, no edema  SKIN: no suspicious lesions or rashes  NEURO: Normal strength and tone, mentation intact and speech normal  BACK: no CVA tenderness, no paralumbar tenderness  PSYCH: mentation appears normal, affect normal/bright  LYMPH: no cervical, supraclavicular, axillary, or inguinal adenopathy        ASSESSMENT/PLAN:       ICD-10-CM    1. Health maintenance examination  Z00.00 REVIEW OF HEALTH MAINTENANCE PROTOCOL ORDERS     CBC with platelets     Comprehensive metabolic panel (BMP + Alb, Alk Phos, ALT, AST, Total. Bili, TP)     Lipid panel reflex to direct LDL Fasting     Hemoglobin A1c     Albumin Random Urine Quantitative with Creat Ratio     TSH with free T4 reflex   2. Elevated serum creatinine  R79.89 Comprehensive metabolic panel (BMP + Alb, Alk Phos, ALT, AST, Total. Bili, TP)     Hemoglobin A1c     Albumin Random Urine Quantitative with Creat Ratio   3. Hyperlipidemia LDL goal <130  E78.5 Comprehensive metabolic panel (BMP + Alb, Alk Phos, ALT, AST, Total. Bili, TP)     Lipid panel reflex to direct LDL Fasting     Hemoglobin A1c     Albumin Random Urine Quantitative with Creat Ratio   4. Prediabetes  R73.03 Comprehensive metabolic panel (BMP + Alb, Alk Phos, ALT, AST, Total. Bili, TP)     Lipid panel reflex to direct LDL Fasting     Hemoglobin A1c     Albumin Random Urine Quantitative with Creat Ratio   5. Elevated TSH  R79.89 TSH with free T4 reflex   6. Vitamin D deficiency  E55.9 Vitamin D Deficiency   7. Intrinsic eczema  L20.84 triamcinolone (KENALOG) 0.1 % external cream       Patient has been advised of split billing requirements and indicates understanding: Yes    COUNSELING:   Reviewed preventive health counseling, as reflected in patient instructions    Estimated body mass index is 29.01 kg/m  as calculated from the following:    Height as of this encounter: 1.803 m (5' 11\").    Weight as of this encounter: 94.3 kg (208 lb).     Weight management plan: " Discussed healthy diet and exercise guidelines    He reports that he quit smoking about 7 years ago. His smoking use included cigarettes. He has never used smokeless tobacco.      Counseling Resources:  ATP IV Guidelines  Pooled Cohorts Equation Calculator  FRAX Risk Assessment  ICSI Preventive Guidelines  Dietary Guidelines for Americans, 2010  USDA's MyPlate  ASA Prophylaxis  Lung CA Screening    Samuel Landrum MD  [facility]

## 2023-04-23 ENCOUNTER — HEALTH MAINTENANCE LETTER (OUTPATIENT)
Age: 43
End: 2023-04-23

## 2023-12-02 ENCOUNTER — HEALTH MAINTENANCE LETTER (OUTPATIENT)
Age: 43
End: 2023-12-02

## 2024-02-06 ENCOUNTER — OFFICE VISIT (OUTPATIENT)
Dept: FAMILY MEDICINE | Facility: CLINIC | Age: 44
End: 2024-02-06
Payer: COMMERCIAL

## 2024-02-06 VITALS
HEART RATE: 79 BPM | HEIGHT: 71 IN | SYSTOLIC BLOOD PRESSURE: 141 MMHG | DIASTOLIC BLOOD PRESSURE: 93 MMHG | RESPIRATION RATE: 16 BRPM | WEIGHT: 216 LBS | TEMPERATURE: 97.5 F | OXYGEN SATURATION: 98 % | BODY MASS INDEX: 30.24 KG/M2

## 2024-02-06 DIAGNOSIS — E55.9 VITAMIN D DEFICIENCY: ICD-10-CM

## 2024-02-06 DIAGNOSIS — E03.9 HYPOTHYROIDISM, UNSPECIFIED TYPE: ICD-10-CM

## 2024-02-06 DIAGNOSIS — E78.5 HYPERLIPIDEMIA LDL GOAL <130: ICD-10-CM

## 2024-02-06 DIAGNOSIS — R73.03 PREDIABETES: ICD-10-CM

## 2024-02-06 DIAGNOSIS — R79.89 ELEVATED SERUM CREATININE: ICD-10-CM

## 2024-02-06 DIAGNOSIS — R79.89 ELEVATED TSH: ICD-10-CM

## 2024-02-06 DIAGNOSIS — Z00.00 HEALTH MAINTENANCE EXAMINATION: Primary | ICD-10-CM

## 2024-02-06 LAB
ALBUMIN SERPL BCG-MCNC: 4.4 G/DL (ref 3.5–5.2)
ALP SERPL-CCNC: 76 U/L (ref 40–150)
ALT SERPL W P-5'-P-CCNC: 61 U/L (ref 0–70)
ANION GAP SERPL CALCULATED.3IONS-SCNC: 8 MMOL/L (ref 7–15)
AST SERPL W P-5'-P-CCNC: 36 U/L (ref 0–45)
BILIRUB SERPL-MCNC: 0.6 MG/DL
BUN SERPL-MCNC: 14.8 MG/DL (ref 6–20)
CALCIUM SERPL-MCNC: 10 MG/DL (ref 8.6–10)
CHLORIDE SERPL-SCNC: 103 MMOL/L (ref 98–107)
CHOLEST SERPL-MCNC: 221 MG/DL
CREAT SERPL-MCNC: 1.37 MG/DL (ref 0.67–1.17)
CREAT UR-MCNC: 61.7 MG/DL
DEPRECATED HCO3 PLAS-SCNC: 27 MMOL/L (ref 22–29)
EGFRCR SERPLBLD CKD-EPI 2021: 65 ML/MIN/1.73M2
ERYTHROCYTE [DISTWIDTH] IN BLOOD BY AUTOMATED COUNT: 13.1 % (ref 10–15)
FASTING STATUS PATIENT QL REPORTED: YES
GLUCOSE SERPL-MCNC: 115 MG/DL (ref 70–99)
HBA1C MFR BLD: 6.6 % (ref 0–5.6)
HCT VFR BLD AUTO: 49.8 % (ref 40–53)
HDLC SERPL-MCNC: 47 MG/DL
HGB BLD-MCNC: 17.2 G/DL (ref 13.3–17.7)
LDLC SERPL CALC-MCNC: 145 MG/DL
MCH RBC QN AUTO: 30.8 PG (ref 26.5–33)
MCHC RBC AUTO-ENTMCNC: 34.5 G/DL (ref 31.5–36.5)
MCV RBC AUTO: 89 FL (ref 78–100)
MICROALBUMIN UR-MCNC: <12 MG/L
MICROALBUMIN/CREAT UR: NORMAL MG/G{CREAT}
NONHDLC SERPL-MCNC: 174 MG/DL
PLATELET # BLD AUTO: 204 10E3/UL (ref 150–450)
POTASSIUM SERPL-SCNC: 4.5 MMOL/L (ref 3.4–5.3)
PROT SERPL-MCNC: 7.3 G/DL (ref 6.4–8.3)
RBC # BLD AUTO: 5.59 10E6/UL (ref 4.4–5.9)
SODIUM SERPL-SCNC: 138 MMOL/L (ref 135–145)
T4 FREE SERPL-MCNC: 0.81 NG/DL (ref 0.9–1.7)
TRIGL SERPL-MCNC: 147 MG/DL
TSH SERPL DL<=0.005 MIU/L-ACNC: 10 UIU/ML (ref 0.3–4.2)
VIT D+METAB SERPL-MCNC: 9 NG/ML (ref 20–50)
WBC # BLD AUTO: 7.1 10E3/UL (ref 4–11)

## 2024-02-06 PROCEDURE — 82306 VITAMIN D 25 HYDROXY: CPT | Performed by: FAMILY MEDICINE

## 2024-02-06 PROCEDURE — 99213 OFFICE O/P EST LOW 20 MIN: CPT | Mod: 25 | Performed by: FAMILY MEDICINE

## 2024-02-06 PROCEDURE — 99396 PREV VISIT EST AGE 40-64: CPT | Performed by: FAMILY MEDICINE

## 2024-02-06 PROCEDURE — 82570 ASSAY OF URINE CREATININE: CPT | Performed by: FAMILY MEDICINE

## 2024-02-06 PROCEDURE — 84439 ASSAY OF FREE THYROXINE: CPT | Performed by: FAMILY MEDICINE

## 2024-02-06 PROCEDURE — 85027 COMPLETE CBC AUTOMATED: CPT | Performed by: FAMILY MEDICINE

## 2024-02-06 PROCEDURE — 80053 COMPREHEN METABOLIC PANEL: CPT | Performed by: FAMILY MEDICINE

## 2024-02-06 PROCEDURE — 82043 UR ALBUMIN QUANTITATIVE: CPT | Performed by: FAMILY MEDICINE

## 2024-02-06 PROCEDURE — 36415 COLL VENOUS BLD VENIPUNCTURE: CPT | Performed by: FAMILY MEDICINE

## 2024-02-06 PROCEDURE — 83036 HEMOGLOBIN GLYCOSYLATED A1C: CPT | Performed by: FAMILY MEDICINE

## 2024-02-06 PROCEDURE — 80061 LIPID PANEL: CPT | Performed by: FAMILY MEDICINE

## 2024-02-06 PROCEDURE — 84443 ASSAY THYROID STIM HORMONE: CPT | Performed by: FAMILY MEDICINE

## 2024-02-06 SDOH — HEALTH STABILITY: PHYSICAL HEALTH: ON AVERAGE, HOW MANY DAYS PER WEEK DO YOU ENGAGE IN MODERATE TO STRENUOUS EXERCISE (LIKE A BRISK WALK)?: 3 DAYS

## 2024-02-06 SDOH — HEALTH STABILITY: PHYSICAL HEALTH: ON AVERAGE, HOW MANY MINUTES DO YOU ENGAGE IN EXERCISE AT THIS LEVEL?: 50 MIN

## 2024-02-06 ASSESSMENT — PAIN SCALES - GENERAL: PAINLEVEL: NO PAIN (0)

## 2024-02-06 ASSESSMENT — SOCIAL DETERMINANTS OF HEALTH (SDOH): HOW OFTEN DO YOU GET TOGETHER WITH FRIENDS OR RELATIVES?: ONCE A WEEK

## 2024-02-06 NOTE — PROGRESS NOTES
"Preventive Care Visit  [facility] MEGHNA Landrum MD, Family Medicine  Feb 6, 2024    Assessment & Plan     Hyperlipidemia LDL goal <130      Health maintenance examination    - CBC with platelets; Future  - Comprehensive metabolic panel (BMP + Alb, Alk Phos, ALT, AST, Total. Bili, TP); Future  - Lipid panel reflex to direct LDL Fasting; Future  - Vitamin D Deficiency; Future  - TSH with free T4 reflex; Future  - Hemoglobin A1c; Future  - Albumin Random Urine Quantitative with Creat Ratio; Future    Elevated serum creatinine    - Comprehensive metabolic panel (BMP + Alb, Alk Phos, ALT, AST, Total. Bili, TP); Future    Prediabetes  Has been elevated with fluctuation, has been gained wt with his jose f visit  Encouraged him to work on life style modification   Will review the lab and update pt  - Lipid panel reflex to direct LDL Fasting; Future  - Hemoglobin A1c; Future  - Albumin Random Urine Quantitative with Creat Ratio; Future    Elevated TSH  Has been fluctuating without clinical sx, will review the lab and update pt  - TSH with free T4 reflex; Future    Vitamin D deficiency  Has been slightly suppressed without sx, will have him to check on lab   - Vitamin D Deficiency; Future            BMI  Estimated body mass index is 30.13 kg/m  as calculated from the following:    Height as of this encounter: 1.803 m (5' 11\").    Weight as of this encounter: 98 kg (216 lb).   Weight management plan: Discussed healthy diet and exercise guidelines    Counseling  Appropriate preventive services were discussed with this patient, including applicable screening as appropriate for fall prevention, nutrition, physical activity, Tobacco-use cessation, weight loss and cognition.  Checklist reviewing preventive services available has been given to the patient.  Reviewed patient's diet, addressing concerns and/or questions.   He is at risk for lack of exercise and has been provided with information to increase " physical activity for the benefit of his well-being.     Patient has been advised of split billing requirements and indicates understanding: Yes    FUTURE APPOINTMENTS:       - Follow-up visit in 1 year     Subjective   Kwasi is a 43 year old, presenting for the following:  Physical         Health Care Directive  Patient does not have a Health Care Directive or Living Will: Discussed advance care planning with patient; however, patient declined at this time.    HPI      PreDiabetes Follow-up    How often are you checking your blood sugar? One time daily  What time of day are you checking your blood sugars (select all that apply)?  Before meals  Have you had any blood sugars above 200?  No  Have you had any blood sugars below 70?  No  What symptoms do you notice when your blood sugar is low?  None  What concerns do you have today about your diabetes? None   Do you have any of these symptoms? (Select all that apply)  No numbness or tingling in feet.  No redness, sores or blisters on feet.  No complaints of excessive thirst.  No reports of blurry vision.  No significant changes to weight.      BP Readings from Last 2 Encounters:   02/06/24 (!) 141/93   09/22/22 114/82     Hemoglobin A1C (%)   Date Value   09/22/2022 6.1 (H)   12/23/2020 6.3 (H)   02/07/2019 6.3 (H)     LDL Cholesterol Calculated (mg/dL)   Date Value   09/22/2022 140 (H)   12/23/2020 102 (H)   02/07/2019 125 (H)             Hypertension Follow-up    Do you check your blood pressure regularly outside of the clinic? No   Are you following a low salt diet? Yes  Are your blood pressures ever more than 140 on the top number (systolic) OR more   than 90 on the bottom number (diastolic), for example 140/90? Yes        2/6/2024   General Health   How would you rate your overall physical health? Good   Feel stress (tense, anxious, or unable to sleep) Not at all         2/6/2024   Nutrition   Three or more servings of calcium each day? (!) I DON'T KNOW   Diet:  Regular (no restrictions)   How many servings of fruit and vegetables per day? (!) 2-3   How many sweetened beverages each day? 0-1         2024   Exercise   Days per week of moderate/strenous exercise 3 days   Average minutes spent exercising at this level 50 min         2024   Social Factors   Frequency of gathering with friends or relatives Once a week   Worry food won't last until get money to buy more No   Food not last or not have enough money for food? No   Do you have housing?  Yes   Are you worried about losing your housing? No   Lack of transportation? No   Unable to get utilities (heat,electricity)? No         2024   Dental   Dentist two times every year? Yes         2024   TB Screening   Were you born outside of [place]?  (!) YES         Today's PHQ-2 Score:       2024     9:17 AM   PHQ-2 (  [employer])   Q1: Little interest or pleasure in doing things 0   Q2: Feeling down, depressed or hopeless 0   PHQ-2 Score 0   Q1: Little interest or pleasure in doing things Not at all   Q2: Feeling down, depressed or hopeless Not at all   PHQ-2 Score 0           2024   Substance Use   Alcohol more than 3/day or more than 7/wk No   Do you use any other substances recreationally? No     Social History     Tobacco Use    Smoking status: Former     Types: Cigarettes     Quit date: 2015     Years since quittin.6    Smokeless tobacco: Never   Substance Use Topics    Alcohol use: No     Alcohol/week: 0.0 standard drinks of alcohol    Drug use: No           2024   STI Screening   New sexual partner(s) since last STI/HIV test? No   The ASCVD Risk score (Vito ROCA, et al., 2019) failed to calculate for the following reasons:    The systolic blood pressure is missing        2024   Contraception/Family Planning   Questions about contraception or family planning No        Reviewed and updated as needed this visit by Provider                    Past Medical History:   Diagnosis Date     Hyperlipidemia 2009    Hyperlipidemia LDL goal <130 2014    NO ACTIVE PROBLEMS     Vitamin D deficiency 8/3/2015     Past Surgical History:   Procedure Laterality Date    NO HISTORY OF SURGERY       BP Readings from Last 3 Encounters:   24 (!) 141/93   22 114/82   21 112/80    Wt Readings from Last 3 Encounters:   24 98 kg (216 lb)   22 94.3 kg (208 lb)   21 94.8 kg (209 lb)                  Patient Active Problem List   Diagnosis    Elevated serum creatinine    Elevated liver enzymes    Family history of early CAD    Family history of diabetes mellitus    Obesity    Hyperlipidemia LDL goal <130    Vitamin D deficiency    Prediabetes     Past Surgical History:   Procedure Laterality Date    NO HISTORY OF SURGERY         Social History     Tobacco Use    Smoking status: Former     Types: Cigarettes     Quit date: 2015     Years since quittin.6    Smokeless tobacco: Never   Substance Use Topics    Alcohol use: No     Alcohol/week: 0.0 standard drinks of alcohol     Family History   Problem Relation Age of Onset    Diabetes Mother     Diabetes Father     Heart Disease Father         CAD   of MI age 58    C.A.D. Mother         CAD age 64         No current outpatient medications on file.     Allergies   Allergen Reactions    No Known Drug Allergy      Recent Labs   Lab Test 22  0918 20  1048 19  0813   A1C 6.1* 6.3* 6.3*   * 102* 125*   HDL 47 46 44   TRIG 192* 209* 187*   ALT 74* 50 66   CR 1.31* 1.30* 1.29*   GFRESTIMATED 69 68 69   GFRESTBLACK  --  78 80   POTASSIUM 4.7 4.6 4.3   TSH 5.70* 3.48 11.28*      Review of Systems    Review of Systems  Constitutional, neuro, ENT, endocrine, pulmonary, cardiac, gastrointestinal, genitourinary, musculoskeletal, integument and psychiatric systems are negative, except as otherwise noted.     Objective    Exam  BP (!) 141/93 (BP Location: Left arm, Patient Position: Sitting, Cuff Size: Adult  "Large)   Pulse 79   Temp 97.5  F (36.4  C) (Temporal)   Resp 16   Ht 1.803 m (5' 11\")   Wt 98 kg (216 lb)   SpO2 98%   BMI 30.13 kg/m     Estimated body mass index is 30.13 kg/m  as calculated from the following:    Height as of this encounter: 1.803 m (5' 11\").    Weight as of this encounter: 98 kg (216 lb).    Physical Exam  GENERAL: alert and no distress  EYES: Eyes grossly normal to inspection, PERRL and conjunctivae and sclerae normal  HENT: ear canals and TM's normal, nose and mouth without ulcers or lesions  NECK: no adenopathy, no asymmetry, masses, or scars  RESP: lungs clear to auscultation - no rales, rhonchi or wheezes  CV: regular rate and rhythm, normal S1 S2, no S3 or S4, no murmur, click or rub, no peripheral edema  ABDOMEN: soft, nontender, no hepatosplenomegaly, no masses and bowel sounds normal  MS: no gross musculoskeletal defects noted, no edema  SKIN: no suspicious lesions or rashes  NEURO: Normal strength and tone, mentation intact and speech normal  BACK: no CVA tenderness, no paralumbar tenderness  PSYCH: mentation appears normal, affect normal/bright      Signed Electronically by: Samuel Landrum MD    "

## 2024-02-07 RX ORDER — ERGOCALCIFEROL 1.25 MG/1
50000 CAPSULE, LIQUID FILLED ORAL WEEKLY
Qty: 8 CAPSULE | Refills: 0 | Status: SHIPPED | OUTPATIENT
Start: 2024-02-07 | End: 2024-03-28

## 2024-02-07 RX ORDER — LEVOTHYROXINE SODIUM 50 UG/1
50 TABLET ORAL DAILY
Qty: 90 TABLET | Refills: 1 | Status: SHIPPED | OUTPATIENT
Start: 2024-02-07

## 2024-02-07 NOTE — PROGRESS NOTES
Based on the lab results we will start vitamin D replacement and hypothyroidism treatment as noted below.        Kwasi,    You maybe able to check the lab results via [employer], it showed your lab results including complete blood cell counts/electrolyte balance and glucose/liver function were all normal.  Your renal function is suppressed as was last time, but urine microalbumin is stable at the reference range.     Your A1C has gotten higher than last time along with high cholesterol indexes. Please work more on life style modification including low fat/carb diet with regular exercise.     Your vitamin D is suppressed significantly, I will send high weekly dose of prescription(50,000 international unit(s) per week for 8 weeks). Please start taking it and switch to daily OTC dose (2,000 - 3,000 international unit(s) daily) after finishing the prescription.    Your thyroid function is suppressed as well. I will send a medicine to pharmacy. Please start taking it and plan to follow up with me at the clinic in 3 months.      Thanks,

## 2024-05-08 ENCOUNTER — OFFICE VISIT (OUTPATIENT)
Dept: FAMILY MEDICINE | Facility: CLINIC | Age: 44
End: 2024-05-08
Payer: COMMERCIAL

## 2024-05-08 VITALS
WEIGHT: 213.5 LBS | TEMPERATURE: 97.3 F | HEIGHT: 72 IN | SYSTOLIC BLOOD PRESSURE: 124 MMHG | DIASTOLIC BLOOD PRESSURE: 84 MMHG | HEART RATE: 78 BPM | OXYGEN SATURATION: 98 % | BODY MASS INDEX: 28.92 KG/M2

## 2024-05-08 DIAGNOSIS — R79.89 ELEVATED TSH: ICD-10-CM

## 2024-05-08 DIAGNOSIS — R73.03 PREDIABETES: ICD-10-CM

## 2024-05-08 DIAGNOSIS — E55.9 VITAMIN D DEFICIENCY: Primary | ICD-10-CM

## 2024-05-08 PROBLEM — E11.9 DIABETES MELLITUS, TYPE 2 (H): Status: ACTIVE | Noted: 2024-05-08

## 2024-05-08 LAB
T4 FREE SERPL-MCNC: 1.07 NG/DL (ref 0.9–1.7)
TSH SERPL DL<=0.005 MIU/L-ACNC: 4.51 UIU/ML (ref 0.3–4.2)
VIT D+METAB SERPL-MCNC: 32 NG/ML (ref 20–50)

## 2024-05-08 PROCEDURE — 84439 ASSAY OF FREE THYROXINE: CPT | Performed by: FAMILY MEDICINE

## 2024-05-08 PROCEDURE — 99214 OFFICE O/P EST MOD 30 MIN: CPT | Performed by: FAMILY MEDICINE

## 2024-05-08 PROCEDURE — 84443 ASSAY THYROID STIM HORMONE: CPT | Performed by: FAMILY MEDICINE

## 2024-05-08 PROCEDURE — 82306 VITAMIN D 25 HYDROXY: CPT | Performed by: FAMILY MEDICINE

## 2024-05-08 PROCEDURE — 36415 COLL VENOUS BLD VENIPUNCTURE: CPT | Performed by: FAMILY MEDICINE

## 2024-05-08 ASSESSMENT — PAIN SCALES - GENERAL: PAINLEVEL: NO PAIN (0)

## 2024-05-08 NOTE — PROGRESS NOTES
Assessment & Plan     Vitamin D deficiency  Has been on OTC vitamin D 5,000 international unit(s)   Has been feeling less tired, will review the lab and update pt   - Vitamin D Deficiency; Future  - Vitamin D Deficiency    Prediabetes  Has been working on life style modification   Will recheck them in 4-6 months     Elevated TSH  Has been stable with current dose of meds  Will review the lab results and update pt  - TSH; Future  - T4, free; Future  - TSH  - T4, free        FUTURE APPOINTMENTS:       - Follow-up visit at next routine exam 4-6 months     Andres Villalpando is a 43 year old, presenting for the following health issues:  Recheck Medication (Vitamin D and Thyroid )      5/8/2024     8:50 AM   Additional Questions   Roomed by Anette GOLD     History of Present Illness       Reason for visit:  Follow    He eats 0-1 servings of fruits and vegetables daily.He consumes 2 sweetened beverage(s) daily.He exercises with enough effort to increase his heart rate 60 or more minutes per day.  He exercises with enough effort to increase his heart rate 4 days per week.   He is taking medications regularly.         Diabetes Follow-up    How often are you checking your blood sugar? Not at all  What concerns do you have today about your diabetes? None   Do you have any of these symptoms? (Select all that apply)  No numbness or tingling in feet.  No redness, sores or blisters on feet.  No complaints of excessive thirst.  No reports of blurry vision.  No significant changes to weight.      BP Readings from Last 2 Encounters:   05/08/24 124/84   02/06/24 (!) 141/93     Hemoglobin A1C (%)   Date Value   02/06/2024 6.6 (H)   09/22/2022 6.1 (H)   12/23/2020 6.3 (H)   02/07/2019 6.3 (H)     LDL Cholesterol Calculated (mg/dL)   Date Value   02/06/2024 145 (H)   09/22/2022 140 (H)   12/23/2020 102 (H)   02/07/2019 125 (H)             Hypothyroidism Follow-up    Since last visit, patient describes the following symptoms: Weight stable,  "no hair loss, no skin changes, no constipation, no loose stools  How many servings of fruits and vegetables do you eat daily?  2-3  How many days per week do you exercise enough to make your heart beat faster? 4  How many minutes a day do you exercise enough to make your heart beat faster? 20 - 29  How many days per week do you miss taking your medication? 0        Review of Systems  Constitutional, HEENT, cardiovascular, pulmonary, GI, , musculoskeletal, neuro, skin, endocrine and psych systems are negative, except as otherwise noted.      Objective    /84   Pulse 78   Temp 97.3  F (36.3  C) (Tympanic)   Ht 1.82 m (5' 11.65\")   Wt 96.8 kg (213 lb 8 oz)   SpO2 98%   BMI 29.24 kg/m    Body mass index is 29.24 kg/m .  Physical Exam   GENERAL: alert and no distress  NECK: no adenopathy, no asymmetry, masses, or scars  RESP: lungs clear to auscultation - no rales, rhonchi or wheezes  CV: regular rate and rhythm, normal S1 S2, no S3 or S4, no murmur, click or rub, no peripheral edema  ABDOMEN: soft, nontender, no hepatosplenomegaly, no masses and bowel sounds normal  MS: no gross musculoskeletal defects noted, no edema            Signed Electronically by: Samuel Landrum MD    "

## 2024-06-29 ENCOUNTER — HEALTH MAINTENANCE LETTER (OUTPATIENT)
Age: 44
End: 2024-06-29

## 2024-08-29 ENCOUNTER — TELEPHONE (OUTPATIENT)
Dept: FAMILY MEDICINE | Facility: CLINIC | Age: 44
End: 2024-08-29
Payer: COMMERCIAL

## 2024-08-29 NOTE — LETTER
August 29, 2024      Kwasi Gonzalez  [address] [place] [address]        Dear Kwais,    I care about your health and have reviewed your health plan. I have reviewed your medical conditions, medication list, and lab results and am making recommendations based on this review, to better manage your health.    You are in particular need of attention regarding:  -Diabetes    I am recommending that you:  -schedule a FOLLOWUP OFFICE APPOINTMENT with me.  I will recheck your: A1c test.    Here is a list of Health Maintenance topics that are due now or due soon:  Health Maintenance Due   Topic Date Due    Pneumococcal Vaccine (1 of 2 - PCV) Never done    Colorectal Cancer Screening  Never done    Eye Exam  10/01/2016    Diabetic Foot Exam  08/03/2018    COVID-19 Vaccine (4 - 2023-24 season) 09/01/2023    A1C Lab  05/06/2024       Please call us at [phone] (or use [employer]) to address the above recommendations.     Thank you for trusting [facility] and we appreciate the opportunity to serve you.  We look forward to supporting your healthcare needs in the future.    Healthy Regards,    Samuel Landrum MD

## 2024-08-29 NOTE — TELEPHONE ENCOUNTER
Patient Quality Outreach    Patient is due for the following:   Diabetes -  A1C    Next Steps:   Schedule a office visit for diabetes check    Type of outreach:    Sent letter.      Questions for provider review:    None           Tommie Schulz MA    8/29/2024  10:04 AM

## 2024-11-16 ENCOUNTER — HEALTH MAINTENANCE LETTER (OUTPATIENT)
Age: 44
End: 2024-11-16

## 2025-03-08 ENCOUNTER — HEALTH MAINTENANCE LETTER (OUTPATIENT)
Age: 45
End: 2025-03-08

## 2025-06-22 ENCOUNTER — HEALTH MAINTENANCE LETTER (OUTPATIENT)
Age: 45
End: 2025-06-22